# Patient Record
Sex: FEMALE | Race: WHITE | NOT HISPANIC OR LATINO | URBAN - METROPOLITAN AREA
[De-identification: names, ages, dates, MRNs, and addresses within clinical notes are randomized per-mention and may not be internally consistent; named-entity substitution may affect disease eponyms.]

---

## 2025-03-01 ENCOUNTER — APPOINTMENT (OUTPATIENT)
Dept: RADIOLOGY | Facility: HOSPITAL | Age: 80
DRG: 189 | End: 2025-03-01
Payer: MEDICARE

## 2025-03-01 ENCOUNTER — HOSPITAL ENCOUNTER (INPATIENT)
Facility: HOSPITAL | Age: 80
DRG: 189 | End: 2025-03-01
Attending: EMERGENCY MEDICINE | Admitting: INTERNAL MEDICINE
Payer: MEDICARE

## 2025-03-01 ENCOUNTER — APPOINTMENT (OUTPATIENT)
Dept: CARDIOLOGY | Facility: HOSPITAL | Age: 80
DRG: 189 | End: 2025-03-01
Payer: MEDICARE

## 2025-03-01 DIAGNOSIS — R91.1 LUNG NODULE: ICD-10-CM

## 2025-03-01 DIAGNOSIS — J44.1 COPD EXACERBATION (MULTI): ICD-10-CM

## 2025-03-01 DIAGNOSIS — R91.8 LUNG INFILTRATE ON CT: ICD-10-CM

## 2025-03-01 DIAGNOSIS — R06.02 SHORTNESS OF BREATH: Primary | ICD-10-CM

## 2025-03-01 DIAGNOSIS — Z72.0 TOBACCO ABUSE: ICD-10-CM

## 2025-03-01 LAB
ALBUMIN SERPL BCP-MCNC: 4.3 G/DL (ref 3.4–5)
ALP SERPL-CCNC: 89 U/L (ref 33–136)
ALT SERPL W P-5'-P-CCNC: 13 U/L (ref 7–45)
ANION GAP BLDV CALCULATED.4IONS-SCNC: 10 MMOL/L (ref 10–25)
ANION GAP SERPL CALC-SCNC: 14 MMOL/L (ref 10–20)
AST SERPL W P-5'-P-CCNC: 17 U/L (ref 9–39)
BASE EXCESS BLDV CALC-SCNC: 1.9 MMOL/L (ref -2–3)
BASOPHILS # BLD AUTO: 0.13 X10*3/UL (ref 0–0.1)
BASOPHILS NFR BLD AUTO: 1.4 %
BILIRUB SERPL-MCNC: 0.7 MG/DL (ref 0–1.2)
BNP SERPL-MCNC: 154 PG/ML (ref 0–99)
BODY TEMPERATURE: 37 DEGREES CELSIUS
BUN SERPL-MCNC: 13 MG/DL (ref 6–23)
CA-I BLDV-SCNC: 1.22 MMOL/L (ref 1.1–1.33)
CALCIUM SERPL-MCNC: 9.8 MG/DL (ref 8.6–10.3)
CARDIAC TROPONIN I PNL SERPL HS: 49 NG/L (ref 0–13)
CARDIAC TROPONIN I PNL SERPL HS: 58 NG/L (ref 0–13)
CHLORIDE BLDV-SCNC: 99 MMOL/L (ref 98–107)
CHLORIDE SERPL-SCNC: 100 MMOL/L (ref 98–107)
CO2 SERPL-SCNC: 26 MMOL/L (ref 21–32)
CREAT SERPL-MCNC: 0.75 MG/DL (ref 0.5–1.05)
EGFRCR SERPLBLD CKD-EPI 2021: 82 ML/MIN/1.73M*2
EOSINOPHIL # BLD AUTO: 0.99 X10*3/UL (ref 0–0.4)
EOSINOPHIL NFR BLD AUTO: 10.9 %
ERYTHROCYTE [DISTWIDTH] IN BLOOD BY AUTOMATED COUNT: 14.6 % (ref 11.5–14.5)
FLUAV RNA RESP QL NAA+PROBE: NOT DETECTED
FLUBV RNA RESP QL NAA+PROBE: NOT DETECTED
GLUCOSE BLDV-MCNC: 153 MG/DL (ref 74–99)
GLUCOSE SERPL-MCNC: 151 MG/DL (ref 74–99)
HCO3 BLDV-SCNC: 28.3 MMOL/L (ref 22–26)
HCT VFR BLD AUTO: 48.9 % (ref 36–46)
HCT VFR BLD EST: 51 % (ref 36–46)
HGB BLD-MCNC: 16.6 G/DL (ref 12–16)
HGB BLDV-MCNC: 17 G/DL (ref 12–16)
HOLD SPECIMEN: NORMAL
IMM GRANULOCYTES # BLD AUTO: 0.03 X10*3/UL (ref 0–0.5)
IMM GRANULOCYTES NFR BLD AUTO: 0.3 % (ref 0–0.9)
INHALED O2 CONCENTRATION: 36 %
LACTATE BLDV-SCNC: 1.3 MMOL/L (ref 0.4–2)
LYMPHOCYTES # BLD AUTO: 1.45 X10*3/UL (ref 0.8–3)
LYMPHOCYTES NFR BLD AUTO: 15.9 %
MAGNESIUM SERPL-MCNC: 1.68 MG/DL (ref 1.6–2.4)
MCH RBC QN AUTO: 30.5 PG (ref 26–34)
MCHC RBC AUTO-ENTMCNC: 33.9 G/DL (ref 32–36)
MCV RBC AUTO: 90 FL (ref 80–100)
MONOCYTES # BLD AUTO: 0.69 X10*3/UL (ref 0.05–0.8)
MONOCYTES NFR BLD AUTO: 7.6 %
NEUTROPHILS # BLD AUTO: 5.83 X10*3/UL (ref 1.6–5.5)
NEUTROPHILS NFR BLD AUTO: 63.9 %
NRBC BLD-RTO: 0 /100 WBCS (ref 0–0)
OXYHGB MFR BLDV: 72.8 % (ref 45–75)
PCO2 BLDV: 49 MM HG (ref 41–51)
PH BLDV: 7.37 PH (ref 7.33–7.43)
PLATELET # BLD AUTO: 301 X10*3/UL (ref 150–450)
PO2 BLDV: 50 MM HG (ref 35–45)
POTASSIUM BLDV-SCNC: 3.5 MMOL/L (ref 3.5–5.3)
POTASSIUM SERPL-SCNC: 3.5 MMOL/L (ref 3.5–5.3)
PROT SERPL-MCNC: 7.3 G/DL (ref 6.4–8.2)
RBC # BLD AUTO: 5.45 X10*6/UL (ref 4–5.2)
RSV RNA RESP QL NAA+PROBE: NOT DETECTED
SAO2 % BLDV: 76 % (ref 45–75)
SARS-COV-2 RNA RESP QL NAA+PROBE: NOT DETECTED
SODIUM BLDV-SCNC: 134 MMOL/L (ref 136–145)
SODIUM SERPL-SCNC: 136 MMOL/L (ref 136–145)
WBC # BLD AUTO: 9.1 X10*3/UL (ref 4.4–11.3)

## 2025-03-01 PROCEDURE — 84075 ASSAY ALKALINE PHOSPHATASE: CPT

## 2025-03-01 PROCEDURE — 94640 AIRWAY INHALATION TREATMENT: CPT

## 2025-03-01 PROCEDURE — 93005 ELECTROCARDIOGRAM TRACING: CPT

## 2025-03-01 PROCEDURE — 74174 CTA ABD&PLVS W/CONTRAST: CPT | Performed by: RADIOLOGY

## 2025-03-01 PROCEDURE — 84484 ASSAY OF TROPONIN QUANT: CPT

## 2025-03-01 PROCEDURE — 2550000001 HC RX 255 CONTRASTS: Performed by: EMERGENCY MEDICINE

## 2025-03-01 PROCEDURE — 36415 COLL VENOUS BLD VENIPUNCTURE: CPT

## 2025-03-01 PROCEDURE — 71046 X-RAY EXAM CHEST 2 VIEWS: CPT

## 2025-03-01 PROCEDURE — 83735 ASSAY OF MAGNESIUM: CPT

## 2025-03-01 PROCEDURE — 2500000002 HC RX 250 W HCPCS SELF ADMINISTERED DRUGS (ALT 637 FOR MEDICARE OP, ALT 636 FOR OP/ED)

## 2025-03-01 PROCEDURE — 2500000004 HC RX 250 GENERAL PHARMACY W/ HCPCS (ALT 636 FOR OP/ED): Performed by: INTERNAL MEDICINE

## 2025-03-01 PROCEDURE — 84132 ASSAY OF SERUM POTASSIUM: CPT

## 2025-03-01 PROCEDURE — 2500000004 HC RX 250 GENERAL PHARMACY W/ HCPCS (ALT 636 FOR OP/ED)

## 2025-03-01 PROCEDURE — 99223 1ST HOSP IP/OBS HIGH 75: CPT | Performed by: INTERNAL MEDICINE

## 2025-03-01 PROCEDURE — 83880 ASSAY OF NATRIURETIC PEPTIDE: CPT

## 2025-03-01 PROCEDURE — 85025 COMPLETE CBC W/AUTO DIFF WBC: CPT

## 2025-03-01 PROCEDURE — 1200000002 HC GENERAL ROOM WITH TELEMETRY DAILY

## 2025-03-01 PROCEDURE — 71275 CT ANGIOGRAPHY CHEST: CPT | Performed by: RADIOLOGY

## 2025-03-01 PROCEDURE — 99285 EMERGENCY DEPT VISIT HI MDM: CPT | Mod: 25 | Performed by: EMERGENCY MEDICINE

## 2025-03-01 PROCEDURE — 87637 SARSCOV2&INF A&B&RSV AMP PRB: CPT

## 2025-03-01 PROCEDURE — 2500000001 HC RX 250 WO HCPCS SELF ADMINISTERED DRUGS (ALT 637 FOR MEDICARE OP): Performed by: INTERNAL MEDICINE

## 2025-03-01 PROCEDURE — 74174 CTA ABD&PLVS W/CONTRAST: CPT

## 2025-03-01 PROCEDURE — 36591 DRAW BLOOD OFF VENOUS DEVICE: CPT | Performed by: EMERGENCY MEDICINE

## 2025-03-01 PROCEDURE — 71046 X-RAY EXAM CHEST 2 VIEWS: CPT | Performed by: RADIOLOGY

## 2025-03-01 PROCEDURE — 96375 TX/PRO/DX INJ NEW DRUG ADDON: CPT

## 2025-03-01 PROCEDURE — 71275 CT ANGIOGRAPHY CHEST: CPT

## 2025-03-01 PROCEDURE — 96365 THER/PROPH/DIAG IV INF INIT: CPT

## 2025-03-01 RX ORDER — GUAIFENESIN 600 MG/1
1200 TABLET, EXTENDED RELEASE ORAL 2 TIMES DAILY
Status: DISCONTINUED | OUTPATIENT
Start: 2025-03-01 | End: 2025-03-03 | Stop reason: HOSPADM

## 2025-03-01 RX ORDER — PANTOPRAZOLE SODIUM 40 MG/1
40 TABLET, DELAYED RELEASE ORAL
COMMUNITY
Start: 2024-12-02 | End: 2025-03-13 | Stop reason: WASHOUT

## 2025-03-01 RX ORDER — IBUPROFEN 200 MG
1 TABLET ORAL DAILY
Status: DISCONTINUED | OUTPATIENT
Start: 2025-03-01 | End: 2025-03-03 | Stop reason: HOSPADM

## 2025-03-01 RX ORDER — ASPIRIN 81 MG/1
81 TABLET ORAL DAILY
COMMUNITY
Start: 2022-09-28

## 2025-03-01 RX ORDER — CEFTRIAXONE 1 G/50ML
1 INJECTION, SOLUTION INTRAVENOUS DAILY
Status: DISCONTINUED | OUTPATIENT
Start: 2025-03-01 | End: 2025-03-03 | Stop reason: HOSPADM

## 2025-03-01 RX ORDER — METOPROLOL SUCCINATE 50 MG/1
50 TABLET, EXTENDED RELEASE ORAL DAILY
COMMUNITY
Start: 2025-01-31

## 2025-03-01 RX ORDER — BENZONATATE 100 MG/1
200 CAPSULE ORAL 3 TIMES DAILY PRN
Status: DISCONTINUED | OUTPATIENT
Start: 2025-03-01 | End: 2025-03-03 | Stop reason: HOSPADM

## 2025-03-01 RX ORDER — ASPIRIN 81 MG/1
81 TABLET ORAL DAILY
Status: DISCONTINUED | OUTPATIENT
Start: 2025-03-01 | End: 2025-03-03 | Stop reason: HOSPADM

## 2025-03-01 RX ORDER — METOPROLOL SUCCINATE 50 MG/1
50 TABLET, EXTENDED RELEASE ORAL DAILY
Status: DISCONTINUED | OUTPATIENT
Start: 2025-03-01 | End: 2025-03-03 | Stop reason: HOSPADM

## 2025-03-01 RX ORDER — ATORVASTATIN CALCIUM 80 MG/1
1 TABLET, FILM COATED ORAL DAILY
COMMUNITY
Start: 2022-09-28

## 2025-03-01 RX ORDER — ATORVASTATIN CALCIUM 80 MG/1
80 TABLET, FILM COATED ORAL DAILY
Status: DISCONTINUED | OUTPATIENT
Start: 2025-03-01 | End: 2025-03-03 | Stop reason: HOSPADM

## 2025-03-01 RX ORDER — LORAZEPAM 0.5 MG/1
0.5 TABLET ORAL EVERY 6 HOURS PRN
Status: DISCONTINUED | OUTPATIENT
Start: 2025-03-01 | End: 2025-03-03 | Stop reason: HOSPADM

## 2025-03-01 RX ORDER — BENZONATATE 200 MG/1
200 CAPSULE ORAL 3 TIMES DAILY PRN
COMMUNITY
Start: 2025-01-31

## 2025-03-01 RX ORDER — IPRATROPIUM BROMIDE AND ALBUTEROL SULFATE 2.5; .5 MG/3ML; MG/3ML
3 SOLUTION RESPIRATORY (INHALATION)
Status: DISCONTINUED | OUTPATIENT
Start: 2025-03-01 | End: 2025-03-01

## 2025-03-01 RX ORDER — IPRATROPIUM BROMIDE AND ALBUTEROL SULFATE 2.5; .5 MG/3ML; MG/3ML
3 SOLUTION RESPIRATORY (INHALATION) EVERY 2 HOUR PRN
Status: DISCONTINUED | OUTPATIENT
Start: 2025-03-01 | End: 2025-03-03 | Stop reason: HOSPADM

## 2025-03-01 RX ORDER — ALBUTEROL SULFATE 90 UG/1
2 INHALANT RESPIRATORY (INHALATION) EVERY 4 HOURS PRN
COMMUNITY
Start: 2025-02-20

## 2025-03-01 RX ORDER — FORMOTEROL FUMARATE 20 UG/2ML
20 SOLUTION RESPIRATORY (INHALATION)
Status: DISCONTINUED | OUTPATIENT
Start: 2025-03-01 | End: 2025-03-03 | Stop reason: HOSPADM

## 2025-03-01 RX ORDER — AZITHROMYCIN 250 MG/1
250 TABLET, FILM COATED ORAL
Status: DISCONTINUED | OUTPATIENT
Start: 2025-03-02 | End: 2025-03-02

## 2025-03-01 RX ORDER — TRAMADOL HYDROCHLORIDE 50 MG/1
50 TABLET ORAL EVERY 6 HOURS PRN
Status: DISCONTINUED | OUTPATIENT
Start: 2025-03-01 | End: 2025-03-03 | Stop reason: HOSPADM

## 2025-03-01 RX ORDER — AMLODIPINE BESYLATE 5 MG/1
5 TABLET ORAL
Status: DISCONTINUED | OUTPATIENT
Start: 2025-03-01 | End: 2025-03-03 | Stop reason: HOSPADM

## 2025-03-01 RX ORDER — TRAMADOL HYDROCHLORIDE 50 MG/1
50 TABLET ORAL EVERY 6 HOURS PRN
COMMUNITY
Start: 2025-02-26

## 2025-03-01 RX ORDER — GABAPENTIN 300 MG/1
300 CAPSULE ORAL 2 TIMES DAILY
Status: DISCONTINUED | OUTPATIENT
Start: 2025-03-01 | End: 2025-03-03 | Stop reason: HOSPADM

## 2025-03-01 RX ORDER — ESCITALOPRAM OXALATE 10 MG/1
10 TABLET ORAL DAILY
Status: DISCONTINUED | OUTPATIENT
Start: 2025-03-01 | End: 2025-03-01

## 2025-03-01 RX ORDER — AMLODIPINE BESYLATE 5 MG/1
5 TABLET ORAL
COMMUNITY
Start: 2024-12-29 | End: 2025-12-29

## 2025-03-01 RX ORDER — LISINOPRIL 40 MG/1
40 TABLET ORAL
Status: DISCONTINUED | OUTPATIENT
Start: 2025-03-01 | End: 2025-03-03 | Stop reason: HOSPADM

## 2025-03-01 RX ORDER — IPRATROPIUM BROMIDE AND ALBUTEROL SULFATE 2.5; .5 MG/3ML; MG/3ML
3 SOLUTION RESPIRATORY (INHALATION)
Status: DISCONTINUED | OUTPATIENT
Start: 2025-03-01 | End: 2025-03-03 | Stop reason: HOSPADM

## 2025-03-01 RX ORDER — IPRATROPIUM BROMIDE AND ALBUTEROL SULFATE 2.5; .5 MG/3ML; MG/3ML
SOLUTION RESPIRATORY (INHALATION)
Status: COMPLETED
Start: 2025-03-01 | End: 2025-03-01

## 2025-03-01 RX ORDER — IPRATROPIUM BROMIDE AND ALBUTEROL SULFATE 2.5; .5 MG/3ML; MG/3ML
3 SOLUTION RESPIRATORY (INHALATION) EVERY 20 MIN
Status: COMPLETED | OUTPATIENT
Start: 2025-03-01 | End: 2025-03-01

## 2025-03-01 RX ORDER — GABAPENTIN 300 MG/1
1 CAPSULE ORAL 2 TIMES DAILY
COMMUNITY
Start: 2022-09-28

## 2025-03-01 RX ORDER — ESCITALOPRAM OXALATE 10 MG/1
10 TABLET ORAL
COMMUNITY
Start: 2025-02-26 | End: 2026-02-26

## 2025-03-01 RX ORDER — TRAZODONE HYDROCHLORIDE 50 MG/1
100 TABLET ORAL NIGHTLY
Status: DISCONTINUED | OUTPATIENT
Start: 2025-03-01 | End: 2025-03-03 | Stop reason: HOSPADM

## 2025-03-01 RX ORDER — LISINOPRIL 40 MG/1
40 TABLET ORAL
COMMUNITY
Start: 2025-01-31 | End: 2026-01-31

## 2025-03-01 RX ORDER — UMECLIDINIUM BROMIDE AND VILANTEROL TRIFENATATE 62.5; 25 UG/1; UG/1
1 POWDER RESPIRATORY (INHALATION)
COMMUNITY
Start: 2025-02-14 | End: 2026-02-14

## 2025-03-01 RX ORDER — TRAZODONE HYDROCHLORIDE 50 MG/1
100 TABLET ORAL NIGHTLY
COMMUNITY
Start: 2025-01-14

## 2025-03-01 RX ORDER — MAGNESIUM SULFATE HEPTAHYDRATE 40 MG/ML
2 INJECTION, SOLUTION INTRAVENOUS ONCE
Status: COMPLETED | OUTPATIENT
Start: 2025-03-01 | End: 2025-03-01

## 2025-03-01 RX ADMIN — IPRATROPIUM BROMIDE AND ALBUTEROL SULFATE 3 ML: .5; 3 SOLUTION RESPIRATORY (INHALATION) at 10:24

## 2025-03-01 RX ADMIN — IPRATROPIUM BROMIDE AND ALBUTEROL SULFATE 3 ML: .5; 3 SOLUTION RESPIRATORY (INHALATION) at 10:22

## 2025-03-01 RX ADMIN — IPRATROPIUM BROMIDE AND ALBUTEROL SULFATE 3 ML: .5; 3 SOLUTION RESPIRATORY (INHALATION) at 10:33

## 2025-03-01 RX ADMIN — METHYLPREDNISOLONE SODIUM SUCCINATE 125 MG: 125 INJECTION, POWDER, FOR SOLUTION INTRAMUSCULAR; INTRAVENOUS at 10:53

## 2025-03-01 RX ADMIN — MAGNESIUM SULFATE HEPTAHYDRATE 2 G: 40 INJECTION, SOLUTION INTRAVENOUS at 10:53

## 2025-03-01 RX ADMIN — IOHEXOL 75 ML: 350 INJECTION, SOLUTION INTRAVENOUS at 12:16

## 2025-03-01 RX ADMIN — GUAIFENESIN 1200 MG: 600 TABLET ORAL at 21:18

## 2025-03-01 RX ADMIN — CEFTRIAXONE SODIUM 1 G: 1 INJECTION, SOLUTION INTRAVENOUS at 21:20

## 2025-03-01 RX ADMIN — LORAZEPAM 0.5 MG: 0.5 TABLET ORAL at 21:18

## 2025-03-01 RX ADMIN — TRAZODONE HYDROCHLORIDE 100 MG: 50 TABLET ORAL at 21:19

## 2025-03-01 RX ADMIN — IOHEXOL 60 ML: 350 INJECTION, SOLUTION INTRAVENOUS at 12:17

## 2025-03-01 RX ADMIN — GABAPENTIN 300 MG: 300 CAPSULE ORAL at 21:18

## 2025-03-01 RX ADMIN — METHYLPREDNISOLONE SODIUM SUCCINATE 40 MG: 40 INJECTION, POWDER, FOR SOLUTION INTRAMUSCULAR; INTRAVENOUS at 18:14

## 2025-03-01 SDOH — ECONOMIC STABILITY: TRANSPORTATION INSECURITY: IN THE PAST 12 MONTHS, HAS LACK OF TRANSPORTATION KEPT YOU FROM MEDICAL APPOINTMENTS OR FROM GETTING MEDICATIONS?: NO

## 2025-03-01 SDOH — SOCIAL STABILITY: SOCIAL INSECURITY: ABUSE: ADULT

## 2025-03-01 SDOH — SOCIAL STABILITY: SOCIAL INSECURITY: WITHIN THE LAST YEAR, HAVE YOU BEEN AFRAID OF YOUR PARTNER OR EX-PARTNER?: NO

## 2025-03-01 SDOH — ECONOMIC STABILITY: INCOME INSECURITY: IN THE PAST 12 MONTHS HAS THE ELECTRIC, GAS, OIL, OR WATER COMPANY THREATENED TO SHUT OFF SERVICES IN YOUR HOME?: NO

## 2025-03-01 SDOH — SOCIAL STABILITY: SOCIAL INSECURITY: WERE YOU ABLE TO COMPLETE ALL THE BEHAVIORAL HEALTH SCREENINGS?: YES

## 2025-03-01 SDOH — HEALTH STABILITY: MENTAL HEALTH: HOW OFTEN DO YOU HAVE A DRINK CONTAINING ALCOHOL?: NEVER

## 2025-03-01 SDOH — ECONOMIC STABILITY: HOUSING INSECURITY: IN THE LAST 12 MONTHS, WAS THERE A TIME WHEN YOU WERE NOT ABLE TO PAY THE MORTGAGE OR RENT ON TIME?: NO

## 2025-03-01 SDOH — ECONOMIC STABILITY: FOOD INSECURITY: HOW HARD IS IT FOR YOU TO PAY FOR THE VERY BASICS LIKE FOOD, HOUSING, MEDICAL CARE, AND HEATING?: NOT VERY HARD

## 2025-03-01 SDOH — SOCIAL STABILITY: SOCIAL INSECURITY
WITHIN THE LAST YEAR, HAVE YOU BEEN KICKED, HIT, SLAPPED, OR OTHERWISE PHYSICALLY HURT BY YOUR PARTNER OR EX-PARTNER?: NO

## 2025-03-01 SDOH — SOCIAL STABILITY: SOCIAL INSECURITY: WITHIN THE LAST YEAR, HAVE YOU BEEN HUMILIATED OR EMOTIONALLY ABUSED IN OTHER WAYS BY YOUR PARTNER OR EX-PARTNER?: NO

## 2025-03-01 SDOH — ECONOMIC STABILITY: HOUSING INSECURITY: IN THE PAST 12 MONTHS, HOW MANY TIMES HAVE YOU MOVED WHERE YOU WERE LIVING?: 0

## 2025-03-01 SDOH — SOCIAL STABILITY: SOCIAL INSECURITY: ARE THERE ANY APPARENT SIGNS OF INJURIES/BEHAVIORS THAT COULD BE RELATED TO ABUSE/NEGLECT?: NO

## 2025-03-01 SDOH — HEALTH STABILITY: MENTAL HEALTH: HOW OFTEN DO YOU HAVE SIX OR MORE DRINKS ON ONE OCCASION?: NEVER

## 2025-03-01 SDOH — ECONOMIC STABILITY: FOOD INSECURITY: WITHIN THE PAST 12 MONTHS, YOU WORRIED THAT YOUR FOOD WOULD RUN OUT BEFORE YOU GOT THE MONEY TO BUY MORE.: NEVER TRUE

## 2025-03-01 SDOH — SOCIAL STABILITY: SOCIAL INSECURITY
WITHIN THE LAST YEAR, HAVE YOU BEEN RAPED OR FORCED TO HAVE ANY KIND OF SEXUAL ACTIVITY BY YOUR PARTNER OR EX-PARTNER?: NO

## 2025-03-01 SDOH — SOCIAL STABILITY: SOCIAL INSECURITY: HAVE YOU HAD THOUGHTS OF HARMING ANYONE ELSE?: NO

## 2025-03-01 SDOH — SOCIAL STABILITY: SOCIAL INSECURITY: HAVE YOU HAD ANY THOUGHTS OF HARMING ANYONE ELSE?: NO

## 2025-03-01 SDOH — SOCIAL STABILITY: SOCIAL INSECURITY: HAS ANYONE EVER THREATENED TO HURT YOUR FAMILY OR YOUR PETS?: NO

## 2025-03-01 SDOH — SOCIAL STABILITY: SOCIAL INSECURITY: DO YOU FEEL ANYONE HAS EXPLOITED OR TAKEN ADVANTAGE OF YOU FINANCIALLY OR OF YOUR PERSONAL PROPERTY?: NO

## 2025-03-01 SDOH — ECONOMIC STABILITY: FOOD INSECURITY: WITHIN THE PAST 12 MONTHS, THE FOOD YOU BOUGHT JUST DIDN'T LAST AND YOU DIDN'T HAVE MONEY TO GET MORE.: NEVER TRUE

## 2025-03-01 SDOH — SOCIAL STABILITY: SOCIAL INSECURITY: DOES ANYONE TRY TO KEEP YOU FROM HAVING/CONTACTING OTHER FRIENDS OR DOING THINGS OUTSIDE YOUR HOME?: NO

## 2025-03-01 SDOH — HEALTH STABILITY: MENTAL HEALTH: HOW MANY DRINKS CONTAINING ALCOHOL DO YOU HAVE ON A TYPICAL DAY WHEN YOU ARE DRINKING?: PATIENT DOES NOT DRINK

## 2025-03-01 SDOH — ECONOMIC STABILITY: HOUSING INSECURITY: AT ANY TIME IN THE PAST 12 MONTHS, WERE YOU HOMELESS OR LIVING IN A SHELTER (INCLUDING NOW)?: NO

## 2025-03-01 SDOH — SOCIAL STABILITY: SOCIAL INSECURITY: DO YOU FEEL UNSAFE GOING BACK TO THE PLACE WHERE YOU ARE LIVING?: NO

## 2025-03-01 SDOH — SOCIAL STABILITY: SOCIAL INSECURITY: ARE YOU OR HAVE YOU BEEN THREATENED OR ABUSED PHYSICALLY, EMOTIONALLY, OR SEXUALLY BY ANYONE?: NO

## 2025-03-01 ASSESSMENT — LIFESTYLE VARIABLES
EVER HAD A DRINK FIRST THING IN THE MORNING TO STEADY YOUR NERVES TO GET RID OF A HANGOVER: NO
HOW OFTEN DO YOU HAVE 6 OR MORE DRINKS ON ONE OCCASION: NEVER
TOTAL SCORE: 0
SKIP TO QUESTIONS 9-10: 1
AUDIT-C TOTAL SCORE: 0
HOW OFTEN DO YOU HAVE A DRINK CONTAINING ALCOHOL: NEVER
AUDIT-C TOTAL SCORE: 0
HOW MANY STANDARD DRINKS CONTAINING ALCOHOL DO YOU HAVE ON A TYPICAL DAY: PATIENT DOES NOT DRINK
SKIP TO QUESTIONS 9-10: 1
AUDIT-C TOTAL SCORE: 0
EVER FELT BAD OR GUILTY ABOUT YOUR DRINKING: NO
HAVE YOU EVER FELT YOU SHOULD CUT DOWN ON YOUR DRINKING: NO
HAVE PEOPLE ANNOYED YOU BY CRITICIZING YOUR DRINKING: NO

## 2025-03-01 ASSESSMENT — ACTIVITIES OF DAILY LIVING (ADL)
HEARING - RIGHT EAR: FUNCTIONAL
ADEQUATE_TO_COMPLETE_ADL: YES
HEARING - LEFT EAR: FUNCTIONAL
FEEDING YOURSELF: INDEPENDENT
GROOMING: INDEPENDENT
BATHING: INDEPENDENT
LACK_OF_TRANSPORTATION: NO
TOILETING: INDEPENDENT
WALKS IN HOME: INDEPENDENT
DRESSING YOURSELF: INDEPENDENT
JUDGMENT_ADEQUATE_SAFELY_COMPLETE_DAILY_ACTIVITIES: YES
LACK_OF_TRANSPORTATION: NO
PATIENT'S MEMORY ADEQUATE TO SAFELY COMPLETE DAILY ACTIVITIES?: YES

## 2025-03-01 ASSESSMENT — COGNITIVE AND FUNCTIONAL STATUS - GENERAL
HELP NEEDED FOR BATHING: A LITTLE
DAILY ACTIVITIY SCORE: 23
PATIENT BASELINE BEDBOUND: NO
CLIMB 3 TO 5 STEPS WITH RAILING: A LITTLE
MOBILITY SCORE: 23

## 2025-03-01 ASSESSMENT — COLUMBIA-SUICIDE SEVERITY RATING SCALE - C-SSRS
1. IN THE PAST MONTH, HAVE YOU WISHED YOU WERE DEAD OR WISHED YOU COULD GO TO SLEEP AND NOT WAKE UP?: NO
2. HAVE YOU ACTUALLY HAD ANY THOUGHTS OF KILLING YOURSELF?: NO
6. HAVE YOU EVER DONE ANYTHING, STARTED TO DO ANYTHING, OR PREPARED TO DO ANYTHING TO END YOUR LIFE?: NO

## 2025-03-01 ASSESSMENT — ENCOUNTER SYMPTOMS
BACK PAIN: 1
FATIGUE: 1
COUGH: 1
SHORTNESS OF BREATH: 1
UNEXPECTED WEIGHT CHANGE: 1
NERVOUS/ANXIOUS: 1

## 2025-03-01 ASSESSMENT — PATIENT HEALTH QUESTIONNAIRE - PHQ9
SUM OF ALL RESPONSES TO PHQ9 QUESTIONS 1 & 2: 0
1. LITTLE INTEREST OR PLEASURE IN DOING THINGS: NOT AT ALL
2. FEELING DOWN, DEPRESSED OR HOPELESS: NOT AT ALL

## 2025-03-01 ASSESSMENT — PAIN SCALES - GENERAL
PAINLEVEL_OUTOF10: 0 - NO PAIN
PAINLEVEL_OUTOF10: 0 - NO PAIN

## 2025-03-01 ASSESSMENT — PAIN - FUNCTIONAL ASSESSMENT: PAIN_FUNCTIONAL_ASSESSMENT: 0-10

## 2025-03-01 NOTE — PROGRESS NOTES
Pharmacy Medication History Review    Radha Logan is a 79 y.o. female admitted for Shortness of breath. Pharmacy reviewed the patient's [id number] medications and allergies for accuracy.    The list below reflectives the updated PTA list. Please review each medication in order reconciliation for additional clarification and justification.  Prior to Admission medications    Medication Sig Start Date End Date Taking? Authorizing Provider   albuterol 90 mcg/actuation inhaler Inhale 2 puffs every 4 hours if needed for wheezing or shortness of breath. 2/20/25  Yes Historical Provider, MD   amLODIPine (Norvasc) 5 mg tablet Take 1 tablet (5 mg) by mouth once daily. 12/29/24 12/29/25 Yes Historical Provider, MD   Anoro Ellipta 62.5-25 mcg/actuation blister with device Inhale 1 puff once daily. 2/14/25 2/14/26 Yes Historical Provider, MD   atorvastatin (Lipitor) 80 mg tablet Take 1 tablet (80 mg) by mouth once daily. 9/28/22  Yes Historical Provider, MD   benzonatate (Tessalon) 200 mg capsule Take 1 capsule (200 mg) by mouth 3 times a day as needed for cough. 1/31/25  Yes Historical Provider, MD   escitalopram (Lexapro) 10 mg tablet Take 1 tablet (10 mg) by mouth once daily. 2/26/25 2/26/26 Yes Historical Provider, MD   gabapentin (Neurontin) 300 mg capsule Take 1 capsule (300 mg) by mouth 2 times a day. 9/28/22  Yes Historical Provider, MD   lisinopril 40 mg tablet Take 1 tablet (40 mg) by mouth once daily. 1/31/25 1/31/26 Yes Historical Provider, MD   metoprolol succinate XL (Toprol-XL) 50 mg 24 hr tablet Take 1 tablet (50 mg) by mouth once daily. 1/31/25  Yes Historical Provider, MD   pantoprazole (ProtoNix) 40 mg EC tablet Take 1 tablet (40 mg) by mouth once daily.  Patient not taking: Reported on 3/1/2025 12/2/24 5/31/25  Historical Provider, MD   traMADol (Ultram) 50 mg tablet Take 1 tablet (50 mg) by mouth every 6 hours if needed. 2/26/25  Yes Historical Provider, MD   traZODone (Desyrel) 50 mg tablet Take  2 tablets (100 mg) by mouth once daily at bedtime. 1/14/25  Yes Historical Provider, MD   aspirin 81 mg EC tablet Take 1 tablet (81 mg) by mouth once daily. 9/28/22  Yes Historical Provider, MD        The list below reflectives the updated allergy list. Please review each documented allergy for additional clarification and justification.  Allergies  Indicated as Unable to Assess by Delia Rankin RN on 3/1/2025 (Patient unable to communicate)        Severity Reactions Comments    Azithromycin Low Nausea Only     Codeine Low Itching     Doxycycline Low Nausea Only     Penicillins Low Nausea/vomiting vomiting            Below are additional concerns with the patient's PTA list.    Patient medical and pharmacy records reviewed for supplemental history.    Adelaide Suarez

## 2025-03-01 NOTE — ED TRIAGE NOTES
Arrives from home with c/o shortness of breath getting worse for a few weeks, states she was dx with pneumonia given abx but did not finish them d/t side effects. Has been using husbands o2

## 2025-03-01 NOTE — H&P
History Of Present Illness  Radha Logan is a 79 y.o. female with a past medical history of COPD, HTN, HLD, RBBB, and MALS. presenting with shortness of breath for one month. Patient left hospital AMA 02/11/2025 with pneumonia. She was given Levaquin on release and states she only took it for 1 day due to N/V. She has not seen any improvement in symptoms stating she has been using her husbands oxygen 2L at home, without the oxygen she can not make it to the kitchen. She has a productive cough clear in color without hemoptysis. Denies N/V/D, congestion, sore throat, and CP.     Past Medical History  HTN  HLD  COPD  RBBB  Lupus anticoagulant  MALS  Chronic back pain  H/o MI (5-7 years ago)      Surgical History  2 C-sections     Social History  Tobacco: cigarettes- quit 2 days ago    Family History  Sister- brain cancer     Allergies  Codeine- Itching  Penicillins- N/V  Fluoroquinolones  Azithromycin- N  Doxycycline- N      Review of Systems   Constitutional:  Positive for fatigue and unexpected weight change.   Respiratory:  Positive for cough and shortness of breath.    Musculoskeletal:  Positive for back pain.   Psychiatric/Behavioral:  The patient is nervous/anxious.    12 systems reviewed- negative for any other symptoms     Physical Exam  Constitutional:       General: She is not in acute distress.  HENT:      Head: Normocephalic and atraumatic.      Mouth/Throat:      Mouth: Mucous membranes are moist.   Eyes:      Pupils: Pupils are equal, round, and reactive to light.   Cardiovascular:      Rate and Rhythm: Normal rate and regular rhythm.   Pulmonary:      Breath sounds: Examination of the right-upper field reveals wheezing. Examination of the left-upper field reveals wheezing. Examination of the right-lower field reveals decreased breath sounds. Decreased breath sounds and wheezing present.   Abdominal:      General: Bowel sounds are normal.      Palpations: Abdomen is soft.      Tenderness: There is no  abdominal tenderness.   Musculoskeletal:      Right lower leg: No edema.      Left lower leg: No edema.   Skin:     Findings: No bruising or rash.   Neurological:      Mental Status: She is alert.          Last Recorded Vitals  BP (!) 156/113   Pulse 64   Temp 36.5 °C (97.7 °F) (Temporal)   Resp (!) 25   Wt 48.1 kg (106 lb)   SpO2 (!) 92%     Relevant Results  No current facility-administered medications for this encounter.    Current Outpatient Medications:     albuterol 90 mcg/actuation inhaler, Inhale 2 puffs every 4 hours if needed for wheezing or shortness of breath., Disp: , Rfl:     amLODIPine (Norvasc) 5 mg tablet, Take 1 tablet (5 mg) by mouth once daily., Disp: , Rfl:     Anoro Ellipta 62.5-25 mcg/actuation blister with device, Inhale 1 puff once daily., Disp: , Rfl:     atorvastatin (Lipitor) 80 mg tablet, Take 1 tablet (80 mg) by mouth once daily., Disp: , Rfl:     benzonatate (Tessalon) 200 mg capsule, Take 1 capsule (200 mg) by mouth 3 times a day as needed for cough., Disp: , Rfl:     escitalopram (Lexapro) 10 mg tablet, Take 1 tablet (10 mg) by mouth once daily., Disp: , Rfl:     gabapentin (Neurontin) 300 mg capsule, Take 1 capsule (300 mg) by mouth 2 times a day., Disp: , Rfl:     lisinopril 40 mg tablet, Take 1 tablet (40 mg) by mouth once daily., Disp: , Rfl:     metoprolol succinate XL (Toprol-XL) 50 mg 24 hr tablet, Take 1 tablet (50 mg) by mouth once daily., Disp: , Rfl:     pantoprazole (ProtoNix) 40 mg EC tablet, Take 1 tablet (40 mg) by mouth once daily. (Patient not taking: Reported on 3/1/2025), Disp: , Rfl:     traMADol (Ultram) 50 mg tablet, Take 1 tablet (50 mg) by mouth every 6 hours if needed., Disp: , Rfl:     traZODone (Desyrel) 50 mg tablet, Take 2 tablets (100 mg) by mouth once daily at bedtime., Disp: , Rfl:     aspirin 81 mg EC tablet, Take 1 tablet (81 mg) by mouth once daily., Disp: , Rfl:      LABS  Results for orders placed or performed during the hospital encounter of  03/01/25 (from the past 24 hours)   Light Blue Top   Result Value Ref Range    Extra Tube Hold for add-ons.    CBC and Auto Differential   Result Value Ref Range    WBC 9.1 4.4 - 11.3 x10*3/uL    nRBC 0.0 0.0 - 0.0 /100 WBCs    RBC 5.45 (H) 4.00 - 5.20 x10*6/uL    Hemoglobin 16.6 (H) 12.0 - 16.0 g/dL    Hematocrit 48.9 (H) 36.0 - 46.0 %    MCV 90 80 - 100 fL    MCH 30.5 26.0 - 34.0 pg    MCHC 33.9 32.0 - 36.0 g/dL    RDW 14.6 (H) 11.5 - 14.5 %    Platelets 301 150 - 450 x10*3/uL    Neutrophils % 63.9 40.0 - 80.0 %    Immature Granulocytes %, Automated 0.3 0.0 - 0.9 %    Lymphocytes % 15.9 13.0 - 44.0 %    Monocytes % 7.6 2.0 - 10.0 %    Eosinophils % 10.9 0.0 - 6.0 %    Basophils % 1.4 0.0 - 2.0 %    Neutrophils Absolute 5.83 (H) 1.60 - 5.50 x10*3/uL    Immature Granulocytes Absolute, Automated 0.03 0.00 - 0.50 x10*3/uL    Lymphocytes Absolute 1.45 0.80 - 3.00 x10*3/uL    Monocytes Absolute 0.69 0.05 - 0.80 x10*3/uL    Eosinophils Absolute 0.99 (H) 0.00 - 0.40 x10*3/uL    Basophils Absolute 0.13 (H) 0.00 - 0.10 x10*3/uL   Comprehensive metabolic panel   Result Value Ref Range    Glucose 151 (H) 74 - 99 mg/dL    Sodium 136 136 - 145 mmol/L    Potassium 3.5 3.5 - 5.3 mmol/L    Chloride 100 98 - 107 mmol/L    Bicarbonate 26 21 - 32 mmol/L    Anion Gap 14 10 - 20 mmol/L    Urea Nitrogen 13 6 - 23 mg/dL    Creatinine 0.75 0.50 - 1.05 mg/dL    eGFR 82 >60 mL/min/1.73m*2    Calcium 9.8 8.6 - 10.3 mg/dL    Albumin 4.3 3.4 - 5.0 g/dL    Alkaline Phosphatase 89 33 - 136 U/L    Total Protein 7.3 6.4 - 8.2 g/dL    AST 17 9 - 39 U/L    Bilirubin, Total 0.7 0.0 - 1.2 mg/dL    ALT 13 7 - 45 U/L   Magnesium   Result Value Ref Range    Magnesium 1.68 1.60 - 2.40 mg/dL   B-Type Natriuretic Peptide   Result Value Ref Range     (H) 0 - 99 pg/mL   Blood Gas Venous Full Panel   Result Value Ref Range    POCT pH, Venous 7.37 7.33 - 7.43 pH    POCT pCO2, Venous 49 41 - 51 mm Hg    POCT pO2, Venous 50 (H) 35 - 45 mm Hg    POCT SO2,  Venous 76 (H) 45 - 75 %    POCT Oxy Hemoglobin, Venous 72.8 45.0 - 75.0 %    POCT Hematocrit Calculated, Venous 51.0 (H) 36.0 - 46.0 %    POCT Sodium, Venous 134 (L) 136 - 145 mmol/L    POCT Potassium, Venous 3.5 3.5 - 5.3 mmol/L    POCT Chloride, Venous 99 98 - 107 mmol/L    POCT Ionized Calicum, Venous 1.22 1.10 - 1.33 mmol/L    POCT Glucose, Venous 153 (H) 74 - 99 mg/dL    POCT Lactate, Venous 1.3 0.4 - 2.0 mmol/L    POCT Base Excess, Venous 1.9 -2.0 - 3.0 mmol/L    POCT HCO3 Calculated, Venous 28.3 (H) 22.0 - 26.0 mmol/L    POCT Hemoglobin, Venous 17.0 (H) 12.0 - 16.0 g/dL    POCT Anion Gap, Venous 10.0 10.0 - 25.0 mmol/L    Patient Temperature 37.0 degrees Celsius    FiO2 36 %   Troponin I, High Sensitivity, Initial   Result Value Ref Range    Troponin I, High Sensitivity 58 (HH) 0 - 13 ng/L   Sars-CoV-2 and Influenza A/B PCR   Result Value Ref Range    Flu A Result Not Detected Not Detected    Flu B Result Not Detected Not Detected    Coronavirus 2019, PCR Not Detected Not Detected   RSV PCR   Result Value Ref Range    RSV PCR Not Detected Not Detected   Troponin, High Sensitivity, 1 Hour   Result Value Ref Range    Troponin I, High Sensitivity 49 (H) 0 - 13 ng/L      IMAGING  CT angio chest for pulmonary embolism    Result Date: 3/1/2025  Interpreted By:  Hayden Sears, STUDY: CT ANGIO CHEST FOR PULMONARY EMBOLISM; CT ANGIO ABDOMEN PELVIS W AND/OR WO IV IV CONTRAST;  3/1/2025 12:18 pm; 3/1/2025 12:17 pm   INDICATION: Signs/Symptoms:concern for PE/pneumonia; Signs/Symptoms:cp, possibly aortic pathology see on ct angio chest.   COMPARISON: None.   ACCESSION NUMBER(S): [id number]; [id number]   ORDERING CLINICIAN: MARK DOWD   TECHNIQUE: CT of the chest, abdomen, and pelvis was performed.  Contiguous axial images were obtained at 3 mm slice thickness through the chest, abdomen and pelvis. Coronal and sagittal reconstructions at 3mm slice thickness were performed. 75 mL Omnipaque 350 administered  intravenously without immediate complication. MIP reformatted images were generated.   FINDINGS: CHEST:   LUNG/PLEURA/LARGE AIRWAYS: Severe emphysematous changes are present. There is a spiculated nodule in the left upper lobe anteriorly measuring 12 mm. A few nodules in the right lower lobe appears linear on orthogonal planes suggesting scars for example measuring 0.7 cm (Series 406, Image 190) and 0.5 cm (Series 406, Image 220). Calcified granulomas in the right lower lobe are also noted. Otherwise the lungs appear clear. No pleural effusion or pneumothorax Central airways are patent. There are areas endobronchial mucous plugging involving both lower lobes.   VESSELS: No pulmonary embolism identified. Severe coronary atherosclerosis. Severe thoracic aortic atherosclerosis. Ascending aorta is not significantly dilated measuring 34 mm. There is extensive mixing artifact throughout the descending thoracic aorta which is more dilated than the ascending aorta. The proximal descending aorta measures 36 mm and more distally there is a fusiform aneurysm measuring 41 mm. Aneurysm does not extend below the diaphragmatic hiatus.   The thoracic great vessels are tortuous but otherwise patent.   There is severe abdominal aortic atherosclerosis without aneurysm. The abdominal aorta is diffusely ectatic measuring up to 29 mm proximally. The celiac, superior mesenteric, right renal, left renal, and inferior mesenteric arteries are patent.   There is severe atherosclerosis of the bilateral common, internal, and external iliac arteries which are otherwise patent without aneurysm.   HEART: Mild cardiomegaly. No significant pericardial effusion.   MEDIASTINUM AND MONSERRAT: No lymphadenopathy. The esophagus is not dilated.   CHEST WALL AND LOWER NECK: No lymphadenopathy. The visualized thyroid is grossly unremarkable.   ABDOMEN:   LIVER: A few scattered calcified granulomas.   BILE DUCTS: Not dilated.   GALLBLADDER: No calcified stones or  definite inflammation.   PANCREAS: Unremarkable.   SPLEEN: Scattered calcified granulomas.   ADRENAL GLANDS: Unremarkable.   KIDNEYS AND URETERS: Normal in cortical thickness without hydronephrosis.   No focal renal lesions.   PELVIS:   BLADDER: Partially distended.   REPRODUCTIVE ORGANS: Uterus is present.   BOWEL: No findings of bowel inflammation or obstruction.   Unremarkable appendix.   Unremarkable mesentery.     VESSELS: See above. Major portal venous branches grossly appear patent. IVC and visualized major branches are patent.   PERITONEUM/RETROPERITONEUM/LYMPH NODES: No free fluid or free air. No abdominal or pelvic lymphadenopathy.   BONE AND SOFT TISSUE: No focal suspicious skeletal lesions.   Degenerative changes of the spine. Grade 1 anterolisthesis at L4-5.         CHEST: 1.  12 mm spiculated nodule in the left upper lobe, highly suspicious for primary neoplasm, especially in a patient with evidence of severe pulmonary emphysema. Suggest nonemergent referral for tissue sampling. No findings of metastatic disease of the thorax otherwise identified. 2. No pulmonary embolism identified. 3. No acute aortic pathology. There is extensive mixing artifact related to turbulent flow within the descending thoracic aorta, relating to diffuse dilatation compared to the ascending aorta with distal thoracic descending aortic aneurysm up to 4.1 cm in diameter. Otherwise no evidence of aortic dissection or other acute pathology identified. 4. Extensive endobronchial mucous plugging in the lower lobes. Otherwise no focal pulmonary infiltrates identified.   ABDOMEN-PELVIS: 1.  No definite acute findings. 2. Severe atherosclerosis with diffuse ectasia of the abdominal aorta up to 2.9 cm.     Signed by: Hayden Sears 3/1/2025 12:38 PM Dictation workstation:   [id number]    CT angio abdomen pelvis w and or wo IV IV contrast    Result Date: 3/1/2025  Interpreted By:  Hayden Sears, STUDY: CT ANGIO CHEST FOR PULMONARY  EMBOLISM; CT ANGIO ABDOMEN PELVIS W AND/OR WO IV IV CONTRAST;  3/1/2025 12:18 pm; 3/1/2025 12:17 pm   INDICATION: Signs/Symptoms:concern for PE/pneumonia; Signs/Symptoms:cp, possibly aortic pathology see on ct angio chest.   COMPARISON: None.   ACCESSION NUMBER(S): [id number]; [id number]   ORDERING CLINICIAN: MARK DOWD   TECHNIQUE: CT of the chest, abdomen, and pelvis was performed.  Contiguous axial images were obtained at 3 mm slice thickness through the chest, abdomen and pelvis. Coronal and sagittal reconstructions at 3mm slice thickness were performed. 75 mL Omnipaque 350 administered intravenously without immediate complication. MIP reformatted images were generated.   FINDINGS: CHEST:   LUNG/PLEURA/LARGE AIRWAYS: Severe emphysematous changes are present. There is a spiculated nodule in the left upper lobe anteriorly measuring 12 mm. A few nodules in the right lower lobe appears linear on orthogonal planes suggesting scars for example measuring 0.7 cm (Series 406, Image 190) and 0.5 cm (Series 406, Image 220). Calcified granulomas in the right lower lobe are also noted. Otherwise the lungs appear clear. No pleural effusion or pneumothorax Central airways are patent. There are areas endobronchial mucous plugging involving both lower lobes.   VESSELS: No pulmonary embolism identified. Severe coronary atherosclerosis. Severe thoracic aortic atherosclerosis. Ascending aorta is not significantly dilated measuring 34 mm. There is extensive mixing artifact throughout the descending thoracic aorta which is more dilated than the ascending aorta. The proximal descending aorta measures 36 mm and more distally there is a fusiform aneurysm measuring 41 mm. Aneurysm does not extend below the diaphragmatic hiatus.   The thoracic great vessels are tortuous but otherwise patent.   There is severe abdominal aortic atherosclerosis without aneurysm. The abdominal aorta is diffusely ectatic measuring up to 29 mm proximally.  The celiac, superior mesenteric, right renal, left renal, and inferior mesenteric arteries are patent.   There is severe atherosclerosis of the bilateral common, internal, and external iliac arteries which are otherwise patent without aneurysm.   HEART: Mild cardiomegaly. No significant pericardial effusion.   MEDIASTINUM AND MONSERRAT: No lymphadenopathy. The esophagus is not dilated.   CHEST WALL AND LOWER NECK: No lymphadenopathy. The visualized thyroid is grossly unremarkable.   ABDOMEN:   LIVER: A few scattered calcified granulomas.   BILE DUCTS: Not dilated.   GALLBLADDER: No calcified stones or definite inflammation.   PANCREAS: Unremarkable.   SPLEEN: Scattered calcified granulomas.   ADRENAL GLANDS: Unremarkable.   KIDNEYS AND URETERS: Normal in cortical thickness without hydronephrosis.   No focal renal lesions.   PELVIS:   BLADDER: Partially distended.   REPRODUCTIVE ORGANS: Uterus is present.   BOWEL: No findings of bowel inflammation or obstruction.   Unremarkable appendix.   Unremarkable mesentery.     VESSELS: See above. Major portal venous branches grossly appear patent. IVC and visualized major branches are patent.   PERITONEUM/RETROPERITONEUM/LYMPH NODES: No free fluid or free air. No abdominal or pelvic lymphadenopathy.   BONE AND SOFT TISSUE: No focal suspicious skeletal lesions.   Degenerative changes of the spine. Grade 1 anterolisthesis at L4-5.         CHEST: 1.  12 mm spiculated nodule in the left upper lobe, highly suspicious for primary neoplasm, especially in a patient with evidence of severe pulmonary emphysema. Suggest nonemergent referral for tissue sampling. No findings of metastatic disease of the thorax otherwise identified. 2. No pulmonary embolism identified. 3. No acute aortic pathology. There is extensive mixing artifact related to turbulent flow within the descending thoracic aorta, relating to diffuse dilatation compared to the ascending aorta with distal thoracic descending aortic  aneurysm up to 4.1 cm in diameter. Otherwise no evidence of aortic dissection or other acute pathology identified. 4. Extensive endobronchial mucous plugging in the lower lobes. Otherwise no focal pulmonary infiltrates identified.   ABDOMEN-PELVIS: 1.  No definite acute findings. 2. Severe atherosclerosis with diffuse ectasia of the abdominal aorta up to 2.9 cm.     Signed by: Hayden Sears 3/1/2025 12:38 PM Dictation workstation:   [id number]    XR chest 2 views    Result Date: 3/1/2025  Interpreted By:  Luz Hamlin, STUDY: XR CHEST 2 VIEWS 3/1/2025 10:50 am   INDICATION: Signs/Symptoms:pneumonia   COMPARISON: None available.   ACCESSION NUMBER(S): [id number]   ORDERING CLINICIAN: TAVON MAR   TECHNIQUE: PA and lateral views   FINDINGS: There are no infiltrates or effusions. There is a tortuous descending thoracic aorta. Pulmonary vascularity and cardiac silhouette appear normal. Spurring is seen throughout the thoracic spine.       No acute infiltrates. Tortuosity of the descending thoracic aorta noted.   Signed by: Luz Hamlin 3/1/2025 10:57 AM Dictation workstation:   [id number]            Assessment & Plan  Shortness of breath    COPD exacerbation (Multi)      Acute hypoxic respiratory failure/ COPD exacerbation- 4L oxygen wean as tolerated, schedule Duo-Neb every 4-6hrs, Rocephin, Azithromycin (both tolerated inpatient previously), Solu-medrol 40mg IV every 6 hrs. PRN Mucinex 600mg  Nicotine dependence- PRN nicotine patches, smoking cessation counseling  Elevated troponin- tending down, ECG not concerning, no CP  HTN/CAD- continue Amlodipine, Lisinopril, Metoprolol, Aspirin as prescribed  HLD- continue Atorvastatin as prescribed  IBS- continue Dicyclomine as prescribed  GERD- continue Pantoprazole as prescribed  Chronic pain syndrome- continue Tramadol as prescribed  MALS- surgery scheduled 03/19/25  Lupus anticoagulant  Anxiety/ Depression- continue escitalopram as prescribed   Pulmonary nodule- FU  for biopsy outpatient  Descending aortic aneurysm- FU outpatient       RICHARD RUVALCABA

## 2025-03-01 NOTE — ED PROVIDER NOTES
Emergency Department Provider Note        History of Present Illness     History provided by: Patient  Limitations to History: None    HPI:  Patient is a 79-year-old female history of COPD presented to the ED with shortness of breath.  Patient states that 2 weeks ago she was diagnosed with pneumonia at the time patient states that she left with oral antibiotics however states that she had a reaction to the antibiotics and therefore stopped taking them.  Patient states she only took 1 days worth of antibiotics.  Patient states since then she has increased shortness of breath.  With productive sputum and cough.  Patient is denying any chest pain.  Physical Exam   Triage vitals:  T 36.1 °C (96.9 °F)  HR 82  /77  RR 16  O2 96 % None (Room air)    Physical Exam  Constitutional:       Appearance: Normal appearance.   HENT:      Head: Normocephalic.   Eyes:      Extraocular Movements: Extraocular movements intact.   Cardiovascular:      Rate and Rhythm: Normal rate and regular rhythm.   Pulmonary:      Effort: Tachypnea present.      Breath sounds: Wheezing present.   Abdominal:      General: Abdomen is flat.      Palpations: Abdomen is soft.   Musculoskeletal:         General: Normal range of motion.      Cervical back: Normal range of motion.      Right lower leg: No tenderness. No edema.      Left lower leg: No tenderness. No edema.   Skin:     General: Skin is warm.   Neurological:      Mental Status: She is alert. Mental status is at baseline.   Psychiatric:         Mood and Affect: Mood normal.         Behavior: Behavior normal.          Medical Decision Making & ED Course   Medical Decision Making:  Patient is a 79-year-old female presenting to the ED with shortness of breath.  Patient states that she has a history of pneumonia which was not adequately treated 2 weeks ago.  Patient states cough with productive sputum and increased shortness of breath.  Patient states she has been using her 's  oxygen.  Patient presented to the ED hypoxic at 80 was placed on 4 L nasal cannula now satting at 95%.  Patient states that she has not smoked cigarettes in the past 2 days.  Patient was wheezy on my exam concerning for COPD exacerbation in the setting of pneumonia.  Will obtain a chest x-ray as well for possible pneumonia as well as a VBG.  Will treat for COPD exacerbation with DuoNebs, magnesium, Solu-Medrol.  Will obtain EKG and troponin for possible ACS, low concern for PE at this time as patient symptoms are consistent with pneumonia and COPD exacerbation however if workup is negative patient does not improve will likely obtain a CT PE if necessary.  Patient has no history of DVT or PE no unilateral leg swelling is denying any chest pain.  Please see ED course for further details         EKG Independent Interpretation:  Heart rate 60, QTc 480, right bundle branch block with T wave inversions in lead V1, V2, V3 which is consistent to previous EKGs.        The patient was discussed with the following consultants/services: Hospitalist/Admitting Provider who accepted the patient for admission      ED Course as of 03/01/25 1838   Sat Mar 01, 2025   1135 Patient flu and COVID RSV negative, no leukocytosis, electrolytes within normal limits, patient did have an elevated troponin of 50 8 repeat pending. [TS]   1136 VBG shows a pH of 7.37, CO2 49, lactate 1.3. [TS]   1136 Chest x-ray was negative for pneumonia however due to high suspicion and also concern for possible PE will obtain a CT PE [TS]   1322 CT showed no PE, pneumonia however does show left upper lobe mass concerning for cancer.  I spoke to patient who states that she was aware of the mass and was having a hard time getting into pulmonology appointments for or any evaluation.  Due to patient's new oxygen requirement, COPD and possible lung cancer finding.  Patient will be admitted for further workup. [TS]      ED Course User Index  [TS] Astrid Cedeño MD          Diagnoses as of 03/01/25 1838   Shortness of breath   COPD exacerbation (Multi)          Disposition   As a result of their workup, the patient will require admission to the hospital.  The patient was informed of her diagnosis.  The patient was given the opportunity to ask questions and I answered them. The patient agreed to be admitted to the hospital.    Procedures   Procedures    Patient seen and discussed with ED attending physician.    Astrid Cedeño MD  Emergency Medicine     Astrid Cedeño MD  Resident  03/01/25 1838

## 2025-03-01 NOTE — CARE PLAN
The patient's goals for the shift include  adequaate comfort    The clinical goals for the shift include safety and comfort

## 2025-03-02 VITALS
WEIGHT: 106 LBS | SYSTOLIC BLOOD PRESSURE: 147 MMHG | HEART RATE: 67 BPM | RESPIRATION RATE: 21 BRPM | TEMPERATURE: 97.9 F | BODY MASS INDEX: 18.1 KG/M2 | OXYGEN SATURATION: 96 % | HEIGHT: 64 IN | DIASTOLIC BLOOD PRESSURE: 66 MMHG

## 2025-03-02 PROCEDURE — S4991 NICOTINE PATCH NONLEGEND: HCPCS | Performed by: STUDENT IN AN ORGANIZED HEALTH CARE EDUCATION/TRAINING PROGRAM

## 2025-03-02 PROCEDURE — 1200000002 HC GENERAL ROOM WITH TELEMETRY DAILY

## 2025-03-02 PROCEDURE — 2500000001 HC RX 250 WO HCPCS SELF ADMINISTERED DRUGS (ALT 637 FOR MEDICARE OP): Performed by: INTERNAL MEDICINE

## 2025-03-02 PROCEDURE — 2500000002 HC RX 250 W HCPCS SELF ADMINISTERED DRUGS (ALT 637 FOR MEDICARE OP, ALT 636 FOR OP/ED): Performed by: INTERNAL MEDICINE

## 2025-03-02 PROCEDURE — 99232 SBSQ HOSP IP/OBS MODERATE 35: CPT | Performed by: INTERNAL MEDICINE

## 2025-03-02 PROCEDURE — 2500000005 HC RX 250 GENERAL PHARMACY W/O HCPCS: Performed by: INTERNAL MEDICINE

## 2025-03-02 PROCEDURE — 94640 AIRWAY INHALATION TREATMENT: CPT

## 2025-03-02 PROCEDURE — 2500000004 HC RX 250 GENERAL PHARMACY W/ HCPCS (ALT 636 FOR OP/ED): Performed by: INTERNAL MEDICINE

## 2025-03-02 PROCEDURE — 2500000002 HC RX 250 W HCPCS SELF ADMINISTERED DRUGS (ALT 637 FOR MEDICARE OP, ALT 636 FOR OP/ED): Performed by: STUDENT IN AN ORGANIZED HEALTH CARE EDUCATION/TRAINING PROGRAM

## 2025-03-02 RX ADMIN — LISINOPRIL 40 MG: 40 TABLET ORAL at 09:17

## 2025-03-02 RX ADMIN — TRAZODONE HYDROCHLORIDE 100 MG: 50 TABLET ORAL at 20:19

## 2025-03-02 RX ADMIN — METOPROLOL SUCCINATE 50 MG: 50 TABLET, EXTENDED RELEASE ORAL at 09:17

## 2025-03-02 RX ADMIN — AMLODIPINE BESYLATE 5 MG: 5 TABLET ORAL at 09:17

## 2025-03-02 RX ADMIN — IPRATROPIUM BROMIDE AND ALBUTEROL SULFATE 3 ML: .5; 3 SOLUTION RESPIRATORY (INHALATION) at 19:21

## 2025-03-02 RX ADMIN — NICOTINE 1 PATCH: 14 PATCH, EXTENDED RELEASE TRANSDERMAL at 09:18

## 2025-03-02 RX ADMIN — FORMOTEROL FUMARATE DIHYDRATE 20 MCG: 20 SOLUTION RESPIRATORY (INHALATION) at 19:21

## 2025-03-02 RX ADMIN — FORMOTEROL FUMARATE DIHYDRATE 20 MCG: 20 SOLUTION RESPIRATORY (INHALATION) at 07:27

## 2025-03-02 RX ADMIN — GUAIFENESIN 1200 MG: 600 TABLET ORAL at 20:20

## 2025-03-02 RX ADMIN — Medication 2 L/MIN: at 11:09

## 2025-03-02 RX ADMIN — CEFTRIAXONE SODIUM 1 G: 1 INJECTION, SOLUTION INTRAVENOUS at 09:44

## 2025-03-02 RX ADMIN — METHYLPREDNISOLONE SODIUM SUCCINATE 40 MG: 40 INJECTION, POWDER, FOR SOLUTION INTRAMUSCULAR; INTRAVENOUS at 06:21

## 2025-03-02 RX ADMIN — IPRATROPIUM BROMIDE AND ALBUTEROL SULFATE 3 ML: .5; 3 SOLUTION RESPIRATORY (INHALATION) at 07:21

## 2025-03-02 RX ADMIN — GABAPENTIN 300 MG: 300 CAPSULE ORAL at 20:20

## 2025-03-02 RX ADMIN — ATORVASTATIN CALCIUM 80 MG: 80 TABLET, FILM COATED ORAL at 09:17

## 2025-03-02 RX ADMIN — TIOTROPIUM BROMIDE INHALATION SPRAY 2 PUFF: 3.12 SPRAY, METERED RESPIRATORY (INHALATION) at 07:32

## 2025-03-02 RX ADMIN — ASPIRIN 81 MG: 81 TABLET, COATED ORAL at 09:18

## 2025-03-02 RX ADMIN — IPRATROPIUM BROMIDE AND ALBUTEROL SULFATE 3 ML: .5; 3 SOLUTION RESPIRATORY (INHALATION) at 13:18

## 2025-03-02 RX ADMIN — METHYLPREDNISOLONE SODIUM SUCCINATE 40 MG: 40 INJECTION, POWDER, FOR SOLUTION INTRAMUSCULAR; INTRAVENOUS at 00:06

## 2025-03-02 RX ADMIN — GUAIFENESIN 1200 MG: 600 TABLET ORAL at 09:17

## 2025-03-02 RX ADMIN — METHYLPREDNISOLONE SODIUM SUCCINATE 40 MG: 40 INJECTION, POWDER, FOR SOLUTION INTRAMUSCULAR; INTRAVENOUS at 11:52

## 2025-03-02 RX ADMIN — GABAPENTIN 300 MG: 300 CAPSULE ORAL at 09:18

## 2025-03-02 ASSESSMENT — COGNITIVE AND FUNCTIONAL STATUS - GENERAL
DAILY ACTIVITIY SCORE: 20
HELP NEEDED FOR BATHING: A LITTLE
WALKING IN HOSPITAL ROOM: A LITTLE
TOILETING: A LITTLE
DRESSING REGULAR UPPER BODY CLOTHING: A LITTLE
CLIMB 3 TO 5 STEPS WITH RAILING: A LITTLE
PERSONAL GROOMING: A LITTLE
MOBILITY SCORE: 22

## 2025-03-02 ASSESSMENT — PAIN SCALES - GENERAL
PAINLEVEL_OUTOF10: 0 - NO PAIN
PAINLEVEL_OUTOF10: 0 - NO PAIN

## 2025-03-02 ASSESSMENT — PAIN - FUNCTIONAL ASSESSMENT: PAIN_FUNCTIONAL_ASSESSMENT: 0-10

## 2025-03-02 NOTE — CARE PLAN
The patient's goals for the shift include      Problem: Skin  Goal: Prevent/minimize sheer/friction injuries  Outcome: Progressing  Flowsheets (Taken 3/2/2025 1107)  Prevent/minimize sheer/friction injuries:   HOB 30 degrees or less   Turn/reposition every 2 hours/use positioning/transfer devices     Problem: Respiratory  Goal: Clear secretions with interventions this shift  Outcome: Progressing  Flowsheets (Taken 3/2/2025 1107)  Clear secretions with interventions this shift: Encourage/provide pulmonary hygiene/secretion clearance     The clinical goals for the shift include pt will remain hemodynamically stable throughout shift.

## 2025-03-02 NOTE — PROGRESS NOTES
Radha Logan is a 79 y.o. female on day 1 of admission presenting with Shortness of breath.      Subjective   Ms. Radha Logan is a 80yo female on day 1 of admission with COPD exacerbation. She is doing better today no SOB unless she takes off oxygen to go to bathroom. Responding well to medication no N/V. No new complaints. Discussed the importance of smoking cessation- she plans to quit with her .        Objective     Last Recorded Vitals  /65   Pulse 52   Temp 36.2 °C (97.2 °F)   Resp 21   Wt 48.1 kg (106 lb)   SpO2 97%       Physical Exam  Pulmonary:      Breath sounds: Wheezing present.      Comments: 4L NC  Abdominal:      General: Bowel sounds are normal.      Tenderness: There is no abdominal tenderness.   Musculoskeletal:         General: No swelling.         Relevant Results  Scheduled medications  amLODIPine, 5 mg, oral, Daily  aspirin, 81 mg, oral, Daily  atorvastatin, 80 mg, oral, Daily  cefTRIAXone, 1 g, intravenous, Daily  tiotropium, 2 puff, inhalation, Daily   And  formoterol, 20 mcg, nebulization, q12h  gabapentin, 300 mg, oral, BID  guaiFENesin, 1,200 mg, oral, BID  ipratropium-albuteroL, 3 mL, nebulization, TID  lisinopril, 40 mg, oral, Daily  methylPREDNISolone sodium succinate (PF), 40 mg, intravenous, q12h  metoprolol succinate XL, 50 mg, oral, Daily  nicotine, 1 patch, transdermal, Daily  oxygen, , inhalation, Continuous - Inhalation  traZODone, 100 mg, oral, Nightly      PRN medications: benzonatate, ipratropium-albuteroL, LORazepam, traMADol    LABS  Results for orders placed or performed during the hospital encounter of 03/01/25 (from the past 24 hours)   Troponin, High Sensitivity, 1 Hour   Result Value Ref Range    Troponin I, High Sensitivity 49 (H) 0 - 13 ng/L               Assessment & Plan  Shortness of breath    Acute hypoxic respiratory failure/ COPD exacerbation- 4L oxygen wean as tolerated, decrease to Solu-medrol 40mg IV every 12 hrs, continue Duo-Neb every  4-6hrs, Rocephin, Azithromycin (both tolerated inpatient previously). PRN Mucinex 600mg  Nicotine dependence- PRN nicotine patches, smoking cessation counseling- pt voiced understanding and has plans to quit  Elevated troponin- tending down, ECG not concerning, no CP  HTN/CAD- continue Amlodipine, Lisinopril, Metoprolol, Aspirin as prescribed  HLD- continue Atorvastatin as prescribed  IBS- continue Dicyclomine as prescribed  GERD- continue Pantoprazole as prescribed  Chronic pain syndrome- continue Tramadol as prescribed  MALS- surgery scheduled 03/19/25  Lupus anticoagulant  Anxiety/ Depression- continue escitalopram as prescribed   Pulmonary nodule- FU for biopsy outpatient  Descending aortic aneurysm- FU outpatient      RICHARD STEELES

## 2025-03-03 ENCOUNTER — PHARMACY VISIT (OUTPATIENT)
Dept: PHARMACY | Facility: CLINIC | Age: 80
End: 2025-03-03
Payer: COMMERCIAL

## 2025-03-03 VITALS
BODY MASS INDEX: 18.1 KG/M2 | SYSTOLIC BLOOD PRESSURE: 137 MMHG | RESPIRATION RATE: 21 BRPM | DIASTOLIC BLOOD PRESSURE: 62 MMHG | WEIGHT: 106 LBS | TEMPERATURE: 96.8 F | HEIGHT: 64 IN | OXYGEN SATURATION: 96 % | HEART RATE: 62 BPM

## 2025-03-03 PROBLEM — J44.1 COPD EXACERBATION (MULTI): Status: RESOLVED | Noted: 2025-03-03 | Resolved: 2025-03-03

## 2025-03-03 PROBLEM — J44.1 COPD EXACERBATION (MULTI): Status: ACTIVE | Noted: 2025-03-03

## 2025-03-03 PROBLEM — R06.02 SHORTNESS OF BREATH: Status: RESOLVED | Noted: 2025-03-01 | Resolved: 2025-03-03

## 2025-03-03 LAB
ANION GAP SERPL CALC-SCNC: 11 MMOL/L (ref 10–20)
BUN SERPL-MCNC: 22 MG/DL (ref 6–23)
CALCIUM SERPL-MCNC: 9.5 MG/DL (ref 8.6–10.3)
CHLORIDE SERPL-SCNC: 101 MMOL/L (ref 98–107)
CO2 SERPL-SCNC: 30 MMOL/L (ref 21–32)
CREAT SERPL-MCNC: 0.9 MG/DL (ref 0.5–1.05)
EGFRCR SERPLBLD CKD-EPI 2021: 66 ML/MIN/1.73M*2
ERYTHROCYTE [DISTWIDTH] IN BLOOD BY AUTOMATED COUNT: 15 % (ref 11.5–14.5)
GLUCOSE SERPL-MCNC: 145 MG/DL (ref 74–99)
HCT VFR BLD AUTO: 41.3 % (ref 36–46)
HGB BLD-MCNC: 13.8 G/DL (ref 12–16)
MCH RBC QN AUTO: 30.7 PG (ref 26–34)
MCHC RBC AUTO-ENTMCNC: 33.4 G/DL (ref 32–36)
MCV RBC AUTO: 92 FL (ref 80–100)
NRBC BLD-RTO: 0 /100 WBCS (ref 0–0)
PLATELET # BLD AUTO: 294 X10*3/UL (ref 150–450)
POTASSIUM SERPL-SCNC: 4.5 MMOL/L (ref 3.5–5.3)
RBC # BLD AUTO: 4.49 X10*6/UL (ref 4–5.2)
SODIUM SERPL-SCNC: 137 MMOL/L (ref 136–145)
WBC # BLD AUTO: 23 X10*3/UL (ref 4.4–11.3)

## 2025-03-03 PROCEDURE — 2500000002 HC RX 250 W HCPCS SELF ADMINISTERED DRUGS (ALT 637 FOR MEDICARE OP, ALT 636 FOR OP/ED): Performed by: STUDENT IN AN ORGANIZED HEALTH CARE EDUCATION/TRAINING PROGRAM

## 2025-03-03 PROCEDURE — S4991 NICOTINE PATCH NONLEGEND: HCPCS | Performed by: STUDENT IN AN ORGANIZED HEALTH CARE EDUCATION/TRAINING PROGRAM

## 2025-03-03 PROCEDURE — 94640 AIRWAY INHALATION TREATMENT: CPT

## 2025-03-03 PROCEDURE — 94761 N-INVAS EAR/PLS OXIMETRY MLT: CPT

## 2025-03-03 PROCEDURE — 2500000001 HC RX 250 WO HCPCS SELF ADMINISTERED DRUGS (ALT 637 FOR MEDICARE OP): Performed by: INTERNAL MEDICINE

## 2025-03-03 PROCEDURE — 9420000001 HC RT PATIENT EDUCATION 5 MIN

## 2025-03-03 PROCEDURE — 2500000005 HC RX 250 GENERAL PHARMACY W/O HCPCS: Performed by: INTERNAL MEDICINE

## 2025-03-03 PROCEDURE — 80048 BASIC METABOLIC PNL TOTAL CA: CPT | Performed by: INTERNAL MEDICINE

## 2025-03-03 PROCEDURE — 2500000001 HC RX 250 WO HCPCS SELF ADMINISTERED DRUGS (ALT 637 FOR MEDICARE OP): Performed by: STUDENT IN AN ORGANIZED HEALTH CARE EDUCATION/TRAINING PROGRAM

## 2025-03-03 PROCEDURE — 36415 COLL VENOUS BLD VENIPUNCTURE: CPT | Performed by: INTERNAL MEDICINE

## 2025-03-03 PROCEDURE — 2500000002 HC RX 250 W HCPCS SELF ADMINISTERED DRUGS (ALT 637 FOR MEDICARE OP, ALT 636 FOR OP/ED): Performed by: INTERNAL MEDICINE

## 2025-03-03 PROCEDURE — RXMED WILLOW AMBULATORY MEDICATION CHARGE

## 2025-03-03 PROCEDURE — 2500000004 HC RX 250 GENERAL PHARMACY W/ HCPCS (ALT 636 FOR OP/ED): Mod: JZ | Performed by: INTERNAL MEDICINE

## 2025-03-03 PROCEDURE — 85027 COMPLETE CBC AUTOMATED: CPT | Performed by: INTERNAL MEDICINE

## 2025-03-03 PROCEDURE — 99239 HOSP IP/OBS DSCHRG MGMT >30: CPT | Performed by: INTERNAL MEDICINE

## 2025-03-03 RX ORDER — GUAIFENESIN 1200 MG/1
1200 TABLET, EXTENDED RELEASE ORAL 2 TIMES DAILY
Qty: 20 TABLET | Refills: 0 | Status: SHIPPED | OUTPATIENT
Start: 2025-03-03 | End: 2025-03-13

## 2025-03-03 RX ORDER — CEFDINIR 300 MG/1
300 CAPSULE ORAL 2 TIMES DAILY
Qty: 10 CAPSULE | Refills: 0 | Status: SHIPPED | OUTPATIENT
Start: 2025-03-03 | End: 2025-03-08

## 2025-03-03 RX ORDER — ACETAMINOPHEN 325 MG/1
650 TABLET ORAL EVERY 6 HOURS PRN
Status: DISCONTINUED | OUTPATIENT
Start: 2025-03-03 | End: 2025-03-03 | Stop reason: HOSPADM

## 2025-03-03 RX ORDER — PREDNISONE 20 MG/1
TABLET ORAL
Qty: 11 TABLET | Refills: 0 | Status: SHIPPED | OUTPATIENT
Start: 2025-03-03 | End: 2025-03-12

## 2025-03-03 RX ORDER — IBUPROFEN 200 MG
1 TABLET ORAL DAILY
Qty: 14 PATCH | Refills: 0 | Status: SHIPPED | OUTPATIENT
Start: 2025-03-04 | End: 2025-03-18

## 2025-03-03 RX ADMIN — METHYLPREDNISOLONE SODIUM SUCCINATE 40 MG: 40 INJECTION, POWDER, FOR SOLUTION INTRAMUSCULAR; INTRAVENOUS at 12:43

## 2025-03-03 RX ADMIN — METHYLPREDNISOLONE SODIUM SUCCINATE 40 MG: 40 INJECTION, POWDER, FOR SOLUTION INTRAMUSCULAR; INTRAVENOUS at 00:26

## 2025-03-03 RX ADMIN — ATORVASTATIN CALCIUM 80 MG: 80 TABLET, FILM COATED ORAL at 09:53

## 2025-03-03 RX ADMIN — IPRATROPIUM BROMIDE AND ALBUTEROL SULFATE 3 ML: .5; 3 SOLUTION RESPIRATORY (INHALATION) at 14:00

## 2025-03-03 RX ADMIN — GUAIFENESIN 1200 MG: 600 TABLET ORAL at 09:53

## 2025-03-03 RX ADMIN — AMLODIPINE BESYLATE 5 MG: 5 TABLET ORAL at 09:53

## 2025-03-03 RX ADMIN — TRAMADOL HYDROCHLORIDE 50 MG: 50 TABLET, COATED ORAL at 09:58

## 2025-03-03 RX ADMIN — TIOTROPIUM BROMIDE INHALATION SPRAY 2 PUFF: 3.12 SPRAY, METERED RESPIRATORY (INHALATION) at 08:13

## 2025-03-03 RX ADMIN — FORMOTEROL FUMARATE DIHYDRATE 20 MCG: 20 SOLUTION RESPIRATORY (INHALATION) at 08:10

## 2025-03-03 RX ADMIN — IPRATROPIUM BROMIDE AND ALBUTEROL SULFATE 3 ML: .5; 3 SOLUTION RESPIRATORY (INHALATION) at 08:06

## 2025-03-03 RX ADMIN — ACETAMINOPHEN 650 MG: 325 TABLET, FILM COATED ORAL at 04:10

## 2025-03-03 RX ADMIN — NICOTINE 1 PATCH: 14 PATCH, EXTENDED RELEASE TRANSDERMAL at 09:55

## 2025-03-03 RX ADMIN — ASPIRIN 81 MG: 81 TABLET, COATED ORAL at 09:54

## 2025-03-03 RX ADMIN — GABAPENTIN 300 MG: 300 CAPSULE ORAL at 09:54

## 2025-03-03 RX ADMIN — Medication 4 L/MIN: at 08:09

## 2025-03-03 RX ADMIN — LISINOPRIL 40 MG: 40 TABLET ORAL at 09:54

## 2025-03-03 RX ADMIN — METOPROLOL SUCCINATE 50 MG: 50 TABLET, EXTENDED RELEASE ORAL at 09:54

## 2025-03-03 ASSESSMENT — PAIN - FUNCTIONAL ASSESSMENT
PAIN_FUNCTIONAL_ASSESSMENT: 0-10
PAIN_FUNCTIONAL_ASSESSMENT: 0-10

## 2025-03-03 ASSESSMENT — PAIN DESCRIPTION - ORIENTATION: ORIENTATION: RIGHT;LEFT

## 2025-03-03 ASSESSMENT — PAIN SCALES - GENERAL
PAINLEVEL_OUTOF10: 8
PAINLEVEL_OUTOF10: 0 - NO PAIN

## 2025-03-03 ASSESSMENT — PAIN DESCRIPTION - LOCATION: LOCATION: HEAD

## 2025-03-03 NOTE — DISCHARGE SUMMARY
Discharge Diagnosis  Shortness of breath    Issues Requiring Follow-Up  Follow up with PCP.  Follow up with Pulmonology.  Follow up with Cardiothoracic surgery for stable aortic aneurysm.  Continue prednisone taper and antibiotics for 5 more days.  Continue using Oxygen as instructed.       Discharge Meds     Medication List      START taking these medications     cefdinir 300 mg capsule; Commonly known as: Omnicef; Take 1 capsule (300   mg) by mouth 2 times a day for 5 days.   guaiFENesin 1,200 mg tablet extended release 12hr; Commonly known as:   Mucinex; Take 1 tablet (1,200 mg) by mouth 2 times a day for 10 days. Do   not crush, chew, or split.   nicotine 14 mg/24 hr patch; Commonly known as: Nicoderm CQ; Place 1   patch over 24 hours on the skin once daily for 14 days.; Start taking on:   March 4, 2025   predniSONE 20 mg tablet; Commonly known as: Deltasone; Take 2 tablets   (40 mg) by mouth once daily for 3 days, THEN 1 tablet (20 mg) once daily   for 3 days, THEN 0.5 tablets (10 mg) once daily for 3 days.; Start taking   on: March 3, 2025     CONTINUE taking these medications     albuterol 90 mcg/actuation inhaler   amLODIPine 5 mg tablet; Commonly known as: Norvasc   Anoro Ellipta 62.5-25 mcg/actuation blister with device; Generic drug:   umeclidinium-vilanteroL   aspirin 81 mg EC tablet   atorvastatin 80 mg tablet; Commonly known as: Lipitor   benzonatate 200 mg capsule; Commonly known as: Tessalon   escitalopram 10 mg tablet; Commonly known as: Lexapro   gabapentin 300 mg capsule; Commonly known as: Neurontin   lisinopril 40 mg tablet   metoprolol succinate XL 50 mg 24 hr tablet; Commonly known as: Toprol-XL   traMADol 50 mg tablet; Commonly known as: Ultram   traZODone 50 mg tablet; Commonly known as: Desyrel     ASK your doctor about these medications     pantoprazole 40 mg EC tablet; Commonly known as: ProtoNix       Test Results Pending At Discharge  Pending Labs       No current pending labs.             Hospital Course   Radha Logan is a 79 y.o. female with a past medical history of COPD, HTN, HLD, RBBB, and MALS. presenting with shortness of breath for one month. Patient left hospital AMA 02/11/2025 with pneumonia. She was given Levaquin on release and states she only took it for 1 day due to N/V. She has not seen any improvement in symptoms stating she has been using her husbands oxygen 2L at home, without the oxygen she can not make it to the kitchen. She has a productive cough clear in color without hemoptysis. Denies N/V/D, congestion, sore throat, and CP.   Patient was admitted for COPD exacerbation and acute hypoxic respiratory failure requiring oxygen, there was also a concern of PNA due to basal infiltrates and mucus production, CT showed speculated nodule, she continued to improve, but she remains in need for oxygen, she was weaned down to 2 L, she remained HD stable, she was counseled multiple times about smoking cessation and the importance of cessation. She was also started on steroids. She wanted to switch her PCP, she was given a list to chose from , she was also given a referral to a lung doctor as well as a referral to lung nodule clinic, she was also given referral to cardiothoracic surgery for follow up on stable fusiform descending thoracic aorta aneurysm. She was tested for oxygen and she was requiring 2 L NC all the time. It was ordered and delivered for her prior to discharge.  Pt is stable and medically optimized for discharge and was advised to follow up with PCP and continue home meds. Discussed diagnosis and treatment in detail and answered all questions. Patient expressed understanding and agreed with plan.  Follow up with PCP.  Follow up with Pulmonology.  Follow up with Cardiothoracic surgery for stable aortic aneurysm.  Continue prednisone taper and antibiotics for 5 more days.  Continue using Oxygen as instructed.          Pertinent Physical Exam At Time of  Discharge  Physical Exam  Constitutional:       Appearance: Normal appearance. She is normal weight.   HENT:      Head: Normocephalic and atraumatic.      Right Ear: Tympanic membrane normal.      Left Ear: Tympanic membrane normal.      Nose: Nose normal.      Mouth/Throat:      Mouth: Mucous membranes are moist.      Pharynx: Oropharynx is clear.   Eyes:      General: No scleral icterus.        Right eye: No discharge.         Left eye: No discharge.      Extraocular Movements: Extraocular movements intact.      Conjunctiva/sclera: Conjunctivae normal.      Pupils: Pupils are equal, round, and reactive to light.   Neck:      Vascular: No carotid bruit.   Cardiovascular:      Rate and Rhythm: Normal rate and regular rhythm.      Pulses: Normal pulses.      Heart sounds: Normal heart sounds. No murmur heard.     No friction rub. No gallop.   Pulmonary:      Effort: Pulmonary effort is normal.      Breath sounds: Normal breath sounds.      Comments: Mild diminished air entry but stable fair  Abdominal:      General: Bowel sounds are normal. There is no distension.      Palpations: Abdomen is soft. There is no mass.      Tenderness: There is no abdominal tenderness. There is no right CVA tenderness, left CVA tenderness, guarding or rebound.      Hernia: No hernia is present.   Musculoskeletal:         General: No swelling, tenderness, deformity or signs of injury.      Cervical back: Normal range of motion and neck supple. No rigidity or tenderness.      Right lower leg: No edema.      Left lower leg: No edema.   Lymphadenopathy:      Cervical: No cervical adenopathy.   Skin:     General: Skin is warm.      Coloration: Skin is not jaundiced or pale.      Findings: No bruising, erythema, lesion or rash.   Neurological:      General: No focal deficit present.      Mental Status: She is alert and oriented to person, place, and time. Mental status is at baseline.   Psychiatric:         Mood and Affect: Mood normal.          Behavior: Behavior normal.         Thought Content: Thought content normal.         Judgment: Judgment normal.         Outpatient Follow-Up  No future appointments.    I spent > 30 minutes in patient care and discharge planning.     Haylie White, DO

## 2025-03-03 NOTE — DISCHARGE INSTRUCTIONS
Follow up with PCP.  Follow up with Pulmonology.  Follow up with Cardiothoracic surgery for stable aortic aneurysm.  Continue prednisone taper and antibiotics for 5 more days.  Continue using Oxygen as instructed.

## 2025-03-03 NOTE — DISCHARGE INSTR - APPOINTMENTS
Pulmonary Medicine Follow-Up Visit  Thursday, April 24 2025 at 2:15 pm  [facility]  [address], [address]  [phone]

## 2025-03-03 NOTE — CONSULTS
"Nutrition Initial Assessment:   Nutrition Assessment    Reason for Assessment: Admission nursing screening    Patient is a 79 y.o. female presenting with shortness of breath.       Nutrition History:  Energy Intake: Fair 50-75 %  Food and Nutrient History: Pt reports she was only eating ~50% of her usual intake about a year ago when she originally lost weight. States the last few months she has been eating less due to a decreased appetite and also abdominal pain from MALS, says sometimes she only takes a few bites of her meal. During admission, has two documented meal intakes of 75% in EMR. Denies N/V/D/C and issues chewing/swallowing. Has been drinking 1 boost per day at home.  Vitamin/Herbal Supplement Use: none listed in home med list       Anthropometrics:  Height: 162.6 cm (5' 4\")   Weight: 48.1 kg (106 lb)   BMI (Calculated): 18.19  IBW/kg (Dietitian Calculated): 54.5 kg          Weight History:   Wt Readings from Last 10 Encounters:   03/01/25 48.1 kg (106 lb)   2/11/25 47.6 kg ([facility])   11/16/22 51.7 kg (114 lb)   10/07/22 51.3 kg (113 lb)   09/28/22 50.8 kg (112 lb)      Weight Change %:  Weight History / % Weight Change: Pt reports ~40# wt loss over a 6 month time frame about 1 year ago but has been stable lately. Based on available wt records, pt had a ~4 kg wt loss from 2022 to 2025. It is possible that patient had significant wt gain prior to the reported 40# wt loss, although there are no weight records to confirm. Current weight suggests pt is underweight (BMI 18.2 kg/m2).  Significant Weight Loss: No    Nutrition Focused Physical Exam Findings:    Subcutaneous Fat Loss:   Orbital Fat Pads: Well nourished (slightly bulging fat pads)  Buccal Fat Pads: Well nourished (full, rounded cheeks)  Triceps: Well nourished (ample fat tissue)  Muscle Wasting:  Temporalis: Well nourished (well-defined muscle)  Pectoralis (Clavicular Region): Mild-Moderate (some protrusion of clavicle)  Deltoid/Trapezius: " Well nourished (rounded appearance at arm, shoulder, neck)  Interosseous: Well nourished (muscle bulges)  Edema:  Edema: none  Physical Findings:  Skin: Negative  Digestive System Findings: Abdominal pain    Nutrition Significant Labs:    Reviewed   Nutrition Specific Medications:  Reviewed     I/O:   Last BM Date: 03/28/25;      Dietary Orders (From admission, onward)       Start     Ordered    03/03/25 1223  Oral nutritional supplements  Until discontinued        Comments: chocolate   Question Answer Comment   Deliver with Breakfast    Deliver with Dinner    Select supplement: Ensure Plus High Protein        03/03/25 1222    03/01/25 1803  May Participate in Room Service  ( ROOM SERVICE MAY PARTICIPATE)  Once        Question:  .  Answer:  Yes    03/01/25 1803    03/01/25 1743  Adult diet Regular  Diet effective now        Question:  Diet type  Answer:  Regular    03/01/25 1743                     Estimated Needs:   Total Energy Estimated Needs in 24 hours (kCal): 1443 kCal  Method for Estimating Needs: 30 kcal/kg ABW  Total Protein Estimated Needs in 24 Hours (g): 58 g  Method for Estimating 24 Hour Protein Needs: 1.2 g/kg ABW  Total Fluid Estimated Needs in 24 Hours (mL): 1443 mL  Method for Estimating 24 Hour Fluid Needs: 1 ml/kcal or per MD  Patient on Order Fluid Restriction: No        Nutrition Diagnosis   Malnutrition Diagnosis  Patient has Malnutrition Diagnosis: No    Nutrition Diagnosis  Patient has Nutrition Diagnosis: Yes  Diagnosis Status (1): New  Nutrition Diagnosis 1: Inadequate oral intake  Related to (1): abdominal pain and decreased appetite  As Evidenced by (1): reports of eating less x several months       Nutrition Interventions/Recommendations   Nutrition prescription for oral nutrition    Nutrition Recommendations:  Individualized Nutrition Prescription Provided for : Regular diet with ONS    Nutrition Interventions/Goals:   Interventions: Meals and snacks, Medical food supplement  Meals  and Snacks: General healthful diet  Goal: Consumes 3 meals per day  Medical Food Supplement: Commercial beverage medical food supplement therapy  Goal: Ensure Plus High Protein BID (provides 350 kcal, 20 g protein per serving).      Education Documentation  Nutrition Related Education, taught by Maya Montiel RD at 3/3/2025  2:48 PM.  Learner: Patient  Readiness: Acceptance  Method: Explanation  Response: Verbalizes Understanding  Comment: Educated pt on importance of adequate energy and protein intake to prevent wt loss and malnutrition. Discussed increasing ONS at home to better meet nutrition needs. Also discussed less expensive options for ONS.              Nutrition Monitoring and Evaluation   Food/Nutrient Related History Monitoring  Monitoring and Evaluation Plan: Intake / amount of food, Estimated Energy Intake  Estimated Energy Intake: Energy intake greater or equal to 75% of estimated energy needs  Intake / Amount of food: Consumes at least 75% or more of meals/snacks/supplements, Meets > 75% estimated energy needs    Anthropometric Measurements  Monitoring and Evaluation Plan: Body weight  Body Weight: Body weight - Maintain stable weight         Physical Exam Findings  Monitoring and Evaluation Plan: Digestive System  Digestive System Finding: Abdominal pain  Criteria: Improvement in GI function/symptoms    Goal Status: New goal(s) identified    Time Spent (min): 60 minutes

## 2025-03-03 NOTE — PROGRESS NOTES
Date/Time SpO2 Medical Gas Therapy Medical Gas Delivery Method Oxygen L/min Patient is on During the Study Patient Activity During Study    03/03/25 1200 88 %  None (Room air)  --  --  At rest     03/03/25 1202 93 %  Supplemental oxygen  Nasal cannula  2 L/min  At rest     03/03/25 1205 86 %  None (Room air)  --  --  Ambulating     03/03/25 1206 96 %  Supplemental oxygen  Nasal cannula  2 L/min  Ambulating             Was a Home Oxygen Evaluation Performed? Yes  Based on the Home Oxygen Evaluation, Does the Patient Qualify for Home Oxygen Therapy? Yes    [employer] is the Patient's Preferred DME Company.  Was the DME Company Notified of Home Oxygen Therapy Needs? Yes    Recommendations:    Recommended Oxygen Dose at Rest is 2 L/min   Recommended Oxygen Dose with Activity is 2 L/min

## 2025-03-03 NOTE — PROGRESS NOTES
03/03/25 1240   Discharge Planning   Living Arrangements Spouse/significant other   Support Systems Spouse/significant other;Family members;Friends/neighbors   Type of Residence Private residence   Number of Stairs to Enter Residence 0   Number of Stairs Within Residence 0   Expected Discharge Disposition Home   Does the patient need discharge transport arranged? No     I met with patient at her bedside, verified insurance and demographics.  Patient lives with her .  She does not use mobility aids, denies falls, drives and is independent with ADLs.  She reported she did not use home O2 prior to admission but will require it after discharge; [employer] liaison arranging delivery.  Per patient, she has no PCP so I did deliver a list of PCPs that practice at [facility] per her request.  Care Coordination team following for assistance with discharge planning as needed.  Eliel STEIN TCC

## 2025-03-03 NOTE — NURSING NOTE
Pulmonary Disease Navigator Documentation:    Pulmonary disease education was performed by the Respiratory Therapist with a good understanding: yes  Home oxygen: none prior to admission; patient evaluated today and showing need for 2 lpm nc cont (see separate testing)  COPD triggers discussed and when to notify physician? yes  COPD Education booklet given to patient with education? no  Benefits of participating in a Pulmonary Rehab program discussed: yes  Pulmonary Rehab Referral written? Not at this time  Home medication usage education done? Yes, Albuterol MDI; Anoro.  Patient being discharged with script for Duoneb and home nebulizer machine.  Pursed lip breathing education done? yes  No PFTs on file for my review.    Comments: Patient stated she has been following with a PCP for any outpatient pulmonary needs.  Patient would like to follow with [facility] going forward.  Appointment made on her behalf for 4/24/25 2:15pm with NP, Radha Lo.  Appointment reminder provided to patient and placed on discharge instructions.  Outpatient PFT ordered by medical team and scheduling to contact patient.      This respiratory therapist met with patient to discuss smoking cessation. Patient educated on nicotine addiction, dangers associated with smoking, and risk factors associated with exposure to second hand smoke. Discussed patient's behavioral triggers for smoking, behavioral interventions for successful smoking cessation, and support group availability in the area. Patient has been advised that  hospitals are smoke-free facilities.

## 2025-03-05 ENCOUNTER — TELEPHONE (OUTPATIENT)
Dept: PULMONOLOGY | Facility: CLINIC | Age: 80
End: 2025-03-05
Payer: MEDICARE

## 2025-03-05 NOTE — TELEPHONE ENCOUNTER
03/05/2025 Called patient to find out where she wanted to be seen for follow-up as she had been seen at [facility] for lung nodules in the past. Including a PET CT in July of last year. Left her a message with my number to call back. Prashant Delacruz RN.

## 2025-03-06 ENCOUNTER — TELEPHONE (OUTPATIENT)
Dept: PRIMARY CARE | Facility: CLINIC | Age: 80
End: 2025-03-06
Payer: MEDICARE

## 2025-03-06 PROBLEM — I25.2 HISTORY OF NON-ST ELEVATION MYOCARDIAL INFARCTION (NSTEMI): Status: ACTIVE | Noted: 2021-12-08

## 2025-03-06 PROBLEM — M79.605 PAIN IN BOTH LOWER EXTREMITIES: Status: ACTIVE | Noted: 2019-04-09

## 2025-03-06 PROBLEM — F41.1 GAD (GENERALIZED ANXIETY DISORDER): Status: ACTIVE | Noted: 2024-01-14

## 2025-03-06 PROBLEM — Z96.1 PSEUDOPHAKIA, RIGHT EYE: Status: ACTIVE | Noted: 2018-03-28

## 2025-03-06 PROBLEM — I25.10 CORONARY ARTERY DISEASE INVOLVING NATIVE CORONARY ARTERY OF NATIVE HEART WITHOUT ANGINA PECTORIS: Status: ACTIVE | Noted: 2018-11-29

## 2025-03-06 PROBLEM — M54.9 BACK PAIN: Status: ACTIVE | Noted: 2025-03-06

## 2025-03-06 PROBLEM — R42 DIZZINESS: Status: ACTIVE | Noted: 2025-03-06

## 2025-03-06 PROBLEM — R10.13 EPIGASTRIC ABDOMINAL PAIN: Status: ACTIVE | Noted: 2019-08-27

## 2025-03-06 PROBLEM — J18.9 PNEUMONIA: Status: ACTIVE | Noted: 2024-11-15

## 2025-03-06 PROBLEM — D72.829 LEUCOCYTOSIS: Status: ACTIVE | Noted: 2018-11-09

## 2025-03-06 PROBLEM — I45.10 RBBB: Status: ACTIVE | Noted: 2025-03-06

## 2025-03-06 PROBLEM — Z86.0100 HISTORY OF COLONIC POLYPS: Status: ACTIVE | Noted: 2019-09-20

## 2025-03-06 PROBLEM — I71.23 ANEURYSM OF DESCENDING THORACIC AORTA WITHOUT RUPTURE (CMS-HCC): Status: ACTIVE | Noted: 2025-01-07

## 2025-03-06 PROBLEM — I95.9 HYPOTENSION: Status: ACTIVE | Noted: 2025-03-06

## 2025-03-06 PROBLEM — Z96.1 PSEUDOPHAKIA, LEFT EYE: Status: ACTIVE | Noted: 2018-03-28

## 2025-03-06 PROBLEM — I77.4 MEDIAN ARCUATE LIGAMENT SYNDROME (CMS-HCC): Status: ACTIVE | Noted: 2023-05-23

## 2025-03-06 PROBLEM — D68.9 DISORDER OF HEMOSTASIS (MULTI): Status: ACTIVE | Noted: 2018-05-25

## 2025-03-06 PROBLEM — I77.4 STENOSIS OF CELIAC ARTERY: Status: ACTIVE | Noted: 2025-03-06

## 2025-03-06 PROBLEM — F17.200 TOBACCO USE DISORDER: Status: ACTIVE | Noted: 2022-11-03

## 2025-03-06 PROBLEM — M79.604 PAIN IN BOTH LOWER EXTREMITIES: Status: ACTIVE | Noted: 2019-04-09

## 2025-03-06 RX ORDER — DICYCLOMINE HYDROCHLORIDE 10 MG/1
CAPSULE ORAL EVERY 6 HOURS PRN
COMMUNITY
Start: 2022-09-28

## 2025-03-06 RX ORDER — OMEPRAZOLE 40 MG/1
40 CAPSULE, DELAYED RELEASE ORAL
COMMUNITY
Start: 2024-07-02

## 2025-03-06 RX ORDER — VARENICLINE TARTRATE 0.5 (11)-1
KIT ORAL
COMMUNITY
Start: 2024-07-19

## 2025-03-06 RX ORDER — PROMETHAZINE HYDROCHLORIDE AND DEXTROMETHORPHAN HYDROBROMIDE 6.25; 15 MG/5ML; MG/5ML
5 SYRUP ORAL EVERY 4 HOURS PRN
COMMUNITY
Start: 2025-01-31 | End: 2025-03-13 | Stop reason: WASHOUT

## 2025-03-06 NOTE — TELEPHONE ENCOUNTER
Radha was agreeable to a virtual visit with the [facility] to start follow up care.

## 2025-03-07 ENCOUNTER — PATIENT OUTREACH (OUTPATIENT)
Dept: CARE COORDINATION | Age: 80
End: 2025-03-07
Payer: MEDICARE

## 2025-03-08 LAB
ATRIAL RATE: 60 BPM
P AXIS: 57 DEGREES
P OFFSET: 192 MS
P ONSET: 126 MS
PR INTERVAL: 192 MS
Q ONSET: 222 MS
QRS COUNT: 10 BEATS
QRS DURATION: 156 MS
QT INTERVAL: 480 MS
QTC CALCULATION(BAZETT): 480 MS
QTC FREDERICIA: 480 MS
R AXIS: 128 DEGREES
T AXIS: 8 DEGREES
T OFFSET: 462 MS
VENTRICULAR RATE: 60 BPM

## 2025-03-11 ENCOUNTER — TELEMEDICINE (OUTPATIENT)
Dept: PRIMARY CARE | Facility: CLINIC | Age: 80
End: 2025-03-11
Payer: MEDICARE

## 2025-03-11 ENCOUNTER — TELEPHONE (OUTPATIENT)
Dept: PRIMARY CARE | Facility: CLINIC | Age: 80
End: 2025-03-11
Payer: MEDICARE

## 2025-03-11 DIAGNOSIS — R91.1 PULMONARY NODULE 1 CM OR GREATER IN DIAMETER: ICD-10-CM

## 2025-03-11 DIAGNOSIS — R91.8 LUNG NODULES: Primary | ICD-10-CM

## 2025-03-11 DIAGNOSIS — R91.1 LUNG NODULE: ICD-10-CM

## 2025-03-11 PROCEDURE — 1126F AMNT PAIN NOTED NONE PRSNT: CPT | Performed by: NURSE PRACTITIONER

## 2025-03-11 PROCEDURE — 1111F DSCHRG MED/CURRENT MED MERGE: CPT | Performed by: NURSE PRACTITIONER

## 2025-03-11 PROCEDURE — 99202 OFFICE O/P NEW SF 15 MIN: CPT | Performed by: NURSE PRACTITIONER

## 2025-03-11 PROCEDURE — 1159F MED LIST DOCD IN RCRD: CPT | Performed by: NURSE PRACTITIONER

## 2025-03-11 SDOH — ECONOMIC STABILITY: FOOD INSECURITY: WITHIN THE PAST 12 MONTHS, THE FOOD YOU BOUGHT JUST DIDN'T LAST AND YOU DIDN'T HAVE MONEY TO GET MORE.: NEVER TRUE

## 2025-03-11 SDOH — ECONOMIC STABILITY: FOOD INSECURITY: WITHIN THE PAST 12 MONTHS, YOU WORRIED THAT YOUR FOOD WOULD RUN OUT BEFORE YOU GOT MONEY TO BUY MORE.: NEVER TRUE

## 2025-03-11 ASSESSMENT — ENCOUNTER SYMPTOMS
DEPRESSION: 1
OCCASIONAL FEELINGS OF UNSTEADINESS: 0
LOSS OF SENSATION IN FEET: 0

## 2025-03-11 ASSESSMENT — LIFESTYLE VARIABLES
AUDIT-C TOTAL SCORE: 0
HOW OFTEN DO YOU HAVE A DRINK CONTAINING ALCOHOL: NEVER
SKIP TO QUESTIONS 9-10: 1
HOW OFTEN DO YOU HAVE SIX OR MORE DRINKS ON ONE OCCASION: NEVER
HOW MANY STANDARD DRINKS CONTAINING ALCOHOL DO YOU HAVE ON A TYPICAL DAY: PATIENT DOES NOT DRINK

## 2025-03-11 ASSESSMENT — PAIN SCALES - GENERAL: PAINLEVEL_OUTOF10: 0-NO PAIN

## 2025-03-11 NOTE — PATIENT INSTRUCTIONS
12 mm spiculated nodule in the left upper lobe, highly suspicious for primary neoplasm, especially in a patient with evidence of severe pulmonary emphysema. Suggest nonemergent referral for tissue sampling.   Referral placed for thoracic surgery - Dr. Jamison      Lung Nodule Clinic  [facility].  [facility] [address]  [place], [place] [address]  Phone [phone]  Fax [phone]  Nurse Coordinator [phone]                                          Welcome to the [facility] Lung Nodule Clinic    Today was the initial consult with the lung nodule clinic to determine proper recommendations for follow up. Your care is coordinated to ensure timely management.  As you know, early detection of cancer is very important.  Nodules that are large, look suspicious or have changed over time is why further evaluation such as the additional imaging test that we have ordered is needed. Our clinic will work closely with you in choosing the best next step.       What is my next step?  We will assist with scheduling scans, results reviews, and referrals for priority appointments.      Who do I call?  Your care coordinator for the lung nodule clinic can be contacted at [phone]  All scheduling needs can be assisted within the Cardiac Surgery/Thoracic Surgery/Lung Nodule Clinic offices at [phone].              Table  Patricia H, Kelly DP, Jamarcus MONDRAGON, et al. Guidelines for Management of Incidental Pulmonary Nodules Detected on CT Images: From the Fleischner Society 2017. Radiology 2017;284:228-243.

## 2025-03-11 NOTE — PROGRESS NOTES
Subjective   Patient ID: Radha Logan is a 79 y.o. female who presents for New Patient Visit (Radha Logan has a new visit regarding a lung nodule. She quit smoking two week ago and is wearing a patch.  She smoked for 50 years one pack per day.  No personal history of cancer. Two sisters had cancer and a niece had cancer.  /).  HPI 79-year-old female presents today for lung nodule clinic.    She quit smoking two week ago and is wearing a patch.  She smoked for 50 years one pack per day.  No personal history of cancer. Two sisters had cancer and a niece had cancer.     Telehealth visit between Radha Logan  and Molly Quinn CNP at [facility] lung nodule clinic per patient request.    CT of the chest abdomen and pelvis 3/1/2025  Severe emphysematous changes are present.  There is a spiculated nodule in the left upper lobe anteriorly  measuring 12 mm. A few nodules in the right lower lobe appears linear on orthogonal planes suggesting scars for example measuring 0.7 cm(Series 406, Image 190) and 0.5 cm (Series 406, Image 220). Calcified granulomas in the right lower lobe are also noted. Otherwise the lungs appear clear. No pleural effusion or pneumothorax  Central airways are patent. There are areas endobronchial mucous  plugging involving both lower lobes.    10/18/2024 CT chest without IV contrast.  Centrilobular and paraseptal emphysema is present. Scarring/atelectasis in the right upper and middle lobes as well as the lung bases bilaterally.     Nodules: Numerous nodules are present. Some of the nodules noted on prior CT from 6/24/2024 have resolved suggestive of prior infectious/inflammatory etiology. Several new nodules when compared to the prior examinations. For example:     *  Solid subpleural nodule in the right lower lobe measuring 8 mm, stable (Series 3, Image 187).   *  Solid pleural-based nodule in the right lower lobe measuring 6 mm, stable (Series 3, Image 217).   *  Solid subpleural  nodule in the right lower lobe measuring 6 mm, new (Series 3, Image 205).   *  Solid 4 mm nodules in the superior aspect of the left upper lobe, new (both best seen on Series 3, Image 168).   Redemonstration of numerous bilateral pulmonary nodules measuring up to 8 mm. Some of these nodules are new from previously while others are stable. Additionally, some nodules from prior examination have resolved suggestive of prior infectious/inflammatory etiology. Follow-up chest CT in 3-6 months should be considered to document stability of new nodules detailed above.   Mediastinal lymphadenopathy, stable.   Severe emphysema.   Aneurysmal dilation of the descending thoracic aorta, stable.   Review of Systems  Review of systems: Present-feeling well. Not present-chills, fatigue and fever.  Respiratory: Not present-difficulty breathing, cough, bloody sputum.  Cardiovascular: Not present-chest pain, palpitations, dyspnea on exertion.  Objective   There were no vitals taken for this visit.     Physical Exam  Gen.: Mental status-alert. Gen. appearance-cooperative, well groomed and consistent with stated age. Not in acute distress or sickly. Orientation-oriented to time, place, purpose and person. Build and nutrition-well-nourished and well-developed.     Assessment/Plan   Diagnoses and all orders for this visit:  Lung nodules  -     Referral to Thoracic Surgery; Future  Lung nodule  -     Referral to Lung Nodule Center  Pulmonary nodule 1 cm or greater in diameter  -     Referral to Thoracic Surgery; Future

## 2025-04-03 ENCOUNTER — APPOINTMENT (OUTPATIENT)
Dept: CARDIOLOGY | Facility: HOSPITAL | Age: 80
DRG: 190 | End: 2025-04-03
Payer: MEDICARE

## 2025-04-03 ENCOUNTER — APPOINTMENT (OUTPATIENT)
Dept: RADIOLOGY | Facility: HOSPITAL | Age: 80
DRG: 190 | End: 2025-04-03
Payer: MEDICARE

## 2025-04-03 ENCOUNTER — HOSPITAL ENCOUNTER (INPATIENT)
Facility: HOSPITAL | Age: 80
End: 2025-04-03
Attending: EMERGENCY MEDICINE | Admitting: INTERNAL MEDICINE
Payer: COMMERCIAL

## 2025-04-03 DIAGNOSIS — Z72.0 TOBACCO ABUSE: ICD-10-CM

## 2025-04-03 DIAGNOSIS — F41.1 GAD (GENERALIZED ANXIETY DISORDER): ICD-10-CM

## 2025-04-03 DIAGNOSIS — R06.00 DYSPNEA, UNSPECIFIED: ICD-10-CM

## 2025-04-03 DIAGNOSIS — R10.13 EPIGASTRIC ABDOMINAL PAIN: ICD-10-CM

## 2025-04-03 DIAGNOSIS — J44.1 COPD EXACERBATION (MULTI): ICD-10-CM

## 2025-04-03 DIAGNOSIS — I10 BENIGN ESSENTIAL HTN: ICD-10-CM

## 2025-04-03 DIAGNOSIS — R06.02 SHORTNESS OF BREATH: Primary | ICD-10-CM

## 2025-04-03 DIAGNOSIS — R09.02 HYPOXIA: ICD-10-CM

## 2025-04-03 DIAGNOSIS — R06.09 OTHER FORMS OF DYSPNEA: ICD-10-CM

## 2025-04-03 PROBLEM — R06.2 WHEEZING: Status: ACTIVE | Noted: 2025-04-03

## 2025-04-03 PROBLEM — R06.82 TACHYPNEA: Status: ACTIVE | Noted: 2025-04-03

## 2025-04-03 LAB
ALBUMIN SERPL BCP-MCNC: 4.2 G/DL (ref 3.4–5)
ALP SERPL-CCNC: 80 U/L (ref 33–136)
ALT SERPL W P-5'-P-CCNC: 16 U/L (ref 7–45)
ANION GAP BLDV CALCULATED.4IONS-SCNC: 7 MMOL/L (ref 10–25)
ANION GAP SERPL CALC-SCNC: 15 MMOL/L (ref 10–20)
AST SERPL W P-5'-P-CCNC: 18 U/L (ref 9–39)
BASE EXCESS BLDV CALC-SCNC: 3.3 MMOL/L (ref -2–3)
BASOPHILS # BLD AUTO: 0.14 X10*3/UL (ref 0–0.1)
BASOPHILS NFR BLD AUTO: 1.1 %
BILIRUB SERPL-MCNC: 0.5 MG/DL (ref 0–1.2)
BNP SERPL-MCNC: 118 PG/ML (ref 0–99)
BODY TEMPERATURE: 37 DEGREES CELSIUS
BUN SERPL-MCNC: 19 MG/DL (ref 6–23)
CA-I BLDV-SCNC: 1.17 MMOL/L (ref 1.1–1.33)
CALCIUM SERPL-MCNC: 9.7 MG/DL (ref 8.6–10.3)
CARDIAC TROPONIN I PNL SERPL HS: 5 NG/L (ref 0–13)
CHLORIDE BLDV-SCNC: 101 MMOL/L (ref 98–107)
CHLORIDE SERPL-SCNC: 100 MMOL/L (ref 98–107)
CO2 SERPL-SCNC: 25 MMOL/L (ref 21–32)
CREAT SERPL-MCNC: 0.68 MG/DL (ref 0.5–1.05)
CRITICAL CALL TIME: 1750
CRITICAL CALLED BY: ABNORMAL
CRITICAL CALLED TO: ABNORMAL
CRITICAL READ BACK: ABNORMAL
EGFRCR SERPLBLD CKD-EPI 2021: 90 ML/MIN/1.73M*2
EOSINOPHIL # BLD AUTO: 0.39 X10*3/UL (ref 0–0.4)
EOSINOPHIL NFR BLD AUTO: 3 %
ERYTHROCYTE [DISTWIDTH] IN BLOOD BY AUTOMATED COUNT: 14.4 % (ref 11.5–14.5)
FLUAV RNA RESP QL NAA+PROBE: NOT DETECTED
FLUBV RNA RESP QL NAA+PROBE: NOT DETECTED
GLUCOSE BLDV-MCNC: 133 MG/DL (ref 74–99)
GLUCOSE SERPL-MCNC: 129 MG/DL (ref 74–99)
HCO3 BLDV-SCNC: 29.6 MMOL/L (ref 22–26)
HCT VFR BLD AUTO: 47.4 % (ref 36–46)
HCT VFR BLD EST: 46 % (ref 36–46)
HGB BLD-MCNC: 14.8 G/DL (ref 12–16)
HGB BLDV-MCNC: 15.2 G/DL (ref 12–16)
IMM GRANULOCYTES # BLD AUTO: 0.05 X10*3/UL (ref 0–0.5)
IMM GRANULOCYTES NFR BLD AUTO: 0.4 % (ref 0–0.9)
INHALED O2 CONCENTRATION: 36 %
LACTATE BLDV-SCNC: 1 MMOL/L (ref 0.4–2)
LYMPHOCYTES # BLD AUTO: 1.45 X10*3/UL (ref 0.8–3)
LYMPHOCYTES NFR BLD AUTO: 11 %
MAGNESIUM SERPL-MCNC: 1.85 MG/DL (ref 1.6–2.4)
MCH RBC QN AUTO: 30.2 PG (ref 26–34)
MCHC RBC AUTO-ENTMCNC: 31.2 G/DL (ref 32–36)
MCV RBC AUTO: 97 FL (ref 80–100)
MONOCYTES # BLD AUTO: 1.12 X10*3/UL (ref 0.05–0.8)
MONOCYTES NFR BLD AUTO: 8.5 %
NEUTROPHILS # BLD AUTO: 10.07 X10*3/UL (ref 1.6–5.5)
NEUTROPHILS NFR BLD AUTO: 76 %
NRBC BLD-RTO: 0 /100 WBCS (ref 0–0)
OXYHGB MFR BLDV: 84.1 % (ref 45–75)
PCO2 BLDV: 50 MM HG (ref 41–51)
PH BLDV: 7.38 PH (ref 7.33–7.43)
PLATELET # BLD AUTO: 308 X10*3/UL (ref 150–450)
PO2 BLDV: 56 MM HG (ref 35–45)
POTASSIUM BLDV-SCNC: 6.1 MMOL/L (ref 3.5–5.3)
POTASSIUM SERPL-SCNC: 4 MMOL/L (ref 3.5–5.3)
PROT SERPL-MCNC: 7 G/DL (ref 6.4–8.2)
RBC # BLD AUTO: 4.9 X10*6/UL (ref 4–5.2)
RSV RNA RESP QL NAA+PROBE: NOT DETECTED
SAO2 % BLDV: 86 % (ref 45–75)
SARS-COV-2 RNA RESP QL NAA+PROBE: NOT DETECTED
SODIUM BLDV-SCNC: 131 MMOL/L (ref 136–145)
SODIUM SERPL-SCNC: 136 MMOL/L (ref 136–145)
WBC # BLD AUTO: 13.2 X10*3/UL (ref 4.4–11.3)

## 2025-04-03 PROCEDURE — 84132 ASSAY OF SERUM POTASSIUM: CPT | Performed by: NURSE PRACTITIONER

## 2025-04-03 PROCEDURE — 2500000001 HC RX 250 WO HCPCS SELF ADMINISTERED DRUGS (ALT 637 FOR MEDICARE OP): Performed by: PHYSICIAN ASSISTANT

## 2025-04-03 PROCEDURE — 82435 ASSAY OF BLOOD CHLORIDE: CPT | Performed by: NURSE PRACTITIONER

## 2025-04-03 PROCEDURE — 84484 ASSAY OF TROPONIN QUANT: CPT | Performed by: NURSE PRACTITIONER

## 2025-04-03 PROCEDURE — 87637 SARSCOV2&INF A&B&RSV AMP PRB: CPT | Performed by: NURSE PRACTITIONER

## 2025-04-03 PROCEDURE — 71045 X-RAY EXAM CHEST 1 VIEW: CPT | Performed by: RADIOLOGY

## 2025-04-03 PROCEDURE — G0378 HOSPITAL OBSERVATION PER HR: HCPCS

## 2025-04-03 PROCEDURE — 83880 ASSAY OF NATRIURETIC PEPTIDE: CPT | Performed by: NURSE PRACTITIONER

## 2025-04-03 PROCEDURE — 36415 COLL VENOUS BLD VENIPUNCTURE: CPT | Performed by: NURSE PRACTITIONER

## 2025-04-03 PROCEDURE — 2500000005 HC RX 250 GENERAL PHARMACY W/O HCPCS: Performed by: PHYSICIAN ASSISTANT

## 2025-04-03 PROCEDURE — 85025 COMPLETE CBC W/AUTO DIFF WBC: CPT | Performed by: NURSE PRACTITIONER

## 2025-04-03 PROCEDURE — 96372 THER/PROPH/DIAG INJ SC/IM: CPT | Performed by: PHYSICIAN ASSISTANT

## 2025-04-03 PROCEDURE — 2500000004 HC RX 250 GENERAL PHARMACY W/ HCPCS (ALT 636 FOR OP/ED): Performed by: PHYSICIAN ASSISTANT

## 2025-04-03 PROCEDURE — 2500000002 HC RX 250 W HCPCS SELF ADMINISTERED DRUGS (ALT 637 FOR MEDICARE OP, ALT 636 FOR OP/ED): Performed by: NURSE PRACTITIONER

## 2025-04-03 PROCEDURE — 99223 1ST HOSP IP/OBS HIGH 75: CPT | Performed by: PHYSICIAN ASSISTANT

## 2025-04-03 PROCEDURE — 99285 EMERGENCY DEPT VISIT HI MDM: CPT | Performed by: EMERGENCY MEDICINE

## 2025-04-03 PROCEDURE — 94640 AIRWAY INHALATION TREATMENT: CPT

## 2025-04-03 PROCEDURE — 2500000004 HC RX 250 GENERAL PHARMACY W/ HCPCS (ALT 636 FOR OP/ED): Mod: JZ | Performed by: NURSE PRACTITIONER

## 2025-04-03 PROCEDURE — 93005 ELECTROCARDIOGRAM TRACING: CPT

## 2025-04-03 PROCEDURE — 84295 ASSAY OF SERUM SODIUM: CPT | Performed by: NURSE PRACTITIONER

## 2025-04-03 PROCEDURE — 71045 X-RAY EXAM CHEST 1 VIEW: CPT

## 2025-04-03 PROCEDURE — 96374 THER/PROPH/DIAG INJ IV PUSH: CPT | Mod: 59

## 2025-04-03 PROCEDURE — 83735 ASSAY OF MAGNESIUM: CPT | Performed by: NURSE PRACTITIONER

## 2025-04-03 RX ORDER — AMLODIPINE BESYLATE 5 MG/1
5 TABLET ORAL DAILY
Status: DISPENSED | OUTPATIENT
Start: 2025-04-04

## 2025-04-03 RX ORDER — ASPIRIN 81 MG/1
81 TABLET ORAL DAILY
Status: DISPENSED | OUTPATIENT
Start: 2025-04-04

## 2025-04-03 RX ORDER — IPRATROPIUM BROMIDE AND ALBUTEROL SULFATE 2.5; .5 MG/3ML; MG/3ML
3 SOLUTION RESPIRATORY (INHALATION) ONCE
Status: COMPLETED | OUTPATIENT
Start: 2025-04-03 | End: 2025-04-03

## 2025-04-03 RX ORDER — ACETAMINOPHEN 650 MG/1
650 SUPPOSITORY RECTAL EVERY 4 HOURS PRN
Status: ACTIVE | OUTPATIENT
Start: 2025-04-03

## 2025-04-03 RX ORDER — BENZONATATE 100 MG/1
200 CAPSULE ORAL 3 TIMES DAILY PRN
Status: ACTIVE | OUTPATIENT
Start: 2025-04-03

## 2025-04-03 RX ORDER — TRAMADOL HYDROCHLORIDE 50 MG/1
50 TABLET ORAL EVERY 6 HOURS PRN
Status: DISPENSED | OUTPATIENT
Start: 2025-04-03

## 2025-04-03 RX ORDER — IPRATROPIUM BROMIDE AND ALBUTEROL SULFATE 2.5; .5 MG/3ML; MG/3ML
3 SOLUTION RESPIRATORY (INHALATION) EVERY 4 HOURS PRN
Status: DISCONTINUED | OUTPATIENT
Start: 2025-04-03 | End: 2025-04-03

## 2025-04-03 RX ORDER — ENOXAPARIN SODIUM 100 MG/ML
40 INJECTION SUBCUTANEOUS EVERY 24 HOURS
Status: DISPENSED | OUTPATIENT
Start: 2025-04-03

## 2025-04-03 RX ORDER — HYDROXYZINE HYDROCHLORIDE 25 MG/1
25 TABLET, FILM COATED ORAL EVERY 8 HOURS PRN
Status: DISPENSED | OUTPATIENT
Start: 2025-04-03

## 2025-04-03 RX ORDER — TALC
3 POWDER (GRAM) TOPICAL NIGHTLY PRN
Status: DISPENSED | OUTPATIENT
Start: 2025-04-03

## 2025-04-03 RX ORDER — ATORVASTATIN CALCIUM 80 MG/1
80 TABLET, FILM COATED ORAL DAILY
Status: DISPENSED | OUTPATIENT
Start: 2025-04-04

## 2025-04-03 RX ORDER — ACETAMINOPHEN 325 MG/1
650 TABLET ORAL EVERY 4 HOURS PRN
Status: ACTIVE | OUTPATIENT
Start: 2025-04-03

## 2025-04-03 RX ORDER — ESCITALOPRAM OXALATE 10 MG/1
10 TABLET ORAL DAILY
Status: DISPENSED | OUTPATIENT
Start: 2025-04-04

## 2025-04-03 RX ORDER — GABAPENTIN 300 MG/1
300 CAPSULE ORAL 2 TIMES DAILY
Status: DISPENSED | OUTPATIENT
Start: 2025-04-03

## 2025-04-03 RX ORDER — ONDANSETRON 4 MG/1
4 TABLET, FILM COATED ORAL EVERY 8 HOURS PRN
Status: DISPENSED | OUTPATIENT
Start: 2025-04-03

## 2025-04-03 RX ORDER — ALBUTEROL SULFATE 0.83 MG/ML
2.5 SOLUTION RESPIRATORY (INHALATION) ONCE
Status: COMPLETED | OUTPATIENT
Start: 2025-04-03 | End: 2025-04-03

## 2025-04-03 RX ORDER — ONDANSETRON HYDROCHLORIDE 2 MG/ML
4 INJECTION, SOLUTION INTRAVENOUS EVERY 8 HOURS PRN
Status: DISPENSED | OUTPATIENT
Start: 2025-04-03

## 2025-04-03 RX ORDER — METOPROLOL SUCCINATE 50 MG/1
50 TABLET, EXTENDED RELEASE ORAL DAILY
Status: DISPENSED | OUTPATIENT
Start: 2025-04-04

## 2025-04-03 RX ORDER — ACETAMINOPHEN 160 MG/5ML
650 SOLUTION ORAL EVERY 4 HOURS PRN
Status: ACTIVE | OUTPATIENT
Start: 2025-04-03

## 2025-04-03 RX ORDER — TRAZODONE HYDROCHLORIDE 50 MG/1
100 TABLET ORAL NIGHTLY
Status: DISPENSED | OUTPATIENT
Start: 2025-04-03

## 2025-04-03 RX ORDER — IPRATROPIUM BROMIDE AND ALBUTEROL SULFATE 2.5; .5 MG/3ML; MG/3ML
3 SOLUTION RESPIRATORY (INHALATION) EVERY 2 HOUR PRN
Status: ACTIVE | OUTPATIENT
Start: 2025-04-03

## 2025-04-03 RX ORDER — LISINOPRIL 40 MG/1
40 TABLET ORAL DAILY
Status: DISPENSED | OUTPATIENT
Start: 2025-04-04

## 2025-04-03 RX ADMIN — TRAZODONE HYDROCHLORIDE 100 MG: 50 TABLET ORAL at 21:57

## 2025-04-03 RX ADMIN — Medication 4 L/MIN: at 21:59

## 2025-04-03 RX ADMIN — HYDROXYZINE HYDROCHLORIDE 25 MG: 25 TABLET ORAL at 21:46

## 2025-04-03 RX ADMIN — ALBUTEROL SULFATE 2.5 MG: 2.5 SOLUTION RESPIRATORY (INHALATION) at 17:38

## 2025-04-03 RX ADMIN — METHYLPREDNISOLONE SODIUM SUCCINATE 125 MG: 125 INJECTION, POWDER, FOR SOLUTION INTRAMUSCULAR; INTRAVENOUS at 18:11

## 2025-04-03 RX ADMIN — IPRATROPIUM BROMIDE AND ALBUTEROL SULFATE 3 ML: .5; 3 SOLUTION RESPIRATORY (INHALATION) at 17:38

## 2025-04-03 RX ADMIN — ONDANSETRON 4 MG: 2 INJECTION INTRAMUSCULAR; INTRAVENOUS at 23:14

## 2025-04-03 RX ADMIN — GABAPENTIN 300 MG: 300 CAPSULE ORAL at 21:58

## 2025-04-03 RX ADMIN — AZITHROMYCIN MONOHYDRATE 500 MG: 500 INJECTION, POWDER, LYOPHILIZED, FOR SOLUTION INTRAVENOUS at 23:07

## 2025-04-03 RX ADMIN — ENOXAPARIN SODIUM 40 MG: 40 INJECTION SUBCUTANEOUS at 21:47

## 2025-04-03 SDOH — SOCIAL STABILITY: SOCIAL INSECURITY: WITHIN THE LAST YEAR, HAVE YOU BEEN HUMILIATED OR EMOTIONALLY ABUSED IN OTHER WAYS BY YOUR PARTNER OR EX-PARTNER?: NO

## 2025-04-03 SDOH — HEALTH STABILITY: MENTAL HEALTH: HOW OFTEN DO YOU HAVE SIX OR MORE DRINKS ON ONE OCCASION?: NEVER

## 2025-04-03 SDOH — HEALTH STABILITY: MENTAL HEALTH: HOW OFTEN DO YOU HAVE A DRINK CONTAINING ALCOHOL?: NEVER

## 2025-04-03 SDOH — ECONOMIC STABILITY: HOUSING INSECURITY: IN THE LAST 12 MONTHS, WAS THERE A TIME WHEN YOU WERE NOT ABLE TO PAY THE MORTGAGE OR RENT ON TIME?: NO

## 2025-04-03 SDOH — SOCIAL STABILITY: SOCIAL INSECURITY: WITHIN THE LAST YEAR, HAVE YOU BEEN AFRAID OF YOUR PARTNER OR EX-PARTNER?: NO

## 2025-04-03 SDOH — SOCIAL STABILITY: SOCIAL INSECURITY: HAVE YOU HAD THOUGHTS OF HARMING ANYONE ELSE?: NO

## 2025-04-03 SDOH — ECONOMIC STABILITY: FOOD INSECURITY: WITHIN THE PAST 12 MONTHS, THE FOOD YOU BOUGHT JUST DIDN'T LAST AND YOU DIDN'T HAVE MONEY TO GET MORE.: NEVER TRUE

## 2025-04-03 SDOH — ECONOMIC STABILITY: HOUSING INSECURITY: IN THE PAST 12 MONTHS, HOW MANY TIMES HAVE YOU MOVED WHERE YOU WERE LIVING?: 0

## 2025-04-03 SDOH — SOCIAL STABILITY: SOCIAL INSECURITY: DOES ANYONE TRY TO KEEP YOU FROM HAVING/CONTACTING OTHER FRIENDS OR DOING THINGS OUTSIDE YOUR HOME?: NO

## 2025-04-03 SDOH — ECONOMIC STABILITY: HOUSING INSECURITY: AT ANY TIME IN THE PAST 12 MONTHS, WERE YOU HOMELESS OR LIVING IN A SHELTER (INCLUDING NOW)?: NO

## 2025-04-03 SDOH — SOCIAL STABILITY: SOCIAL INSECURITY: DO YOU FEEL UNSAFE GOING BACK TO THE PLACE WHERE YOU ARE LIVING?: NO

## 2025-04-03 SDOH — ECONOMIC STABILITY: FOOD INSECURITY: WITHIN THE PAST 12 MONTHS, YOU WORRIED THAT YOUR FOOD WOULD RUN OUT BEFORE YOU GOT THE MONEY TO BUY MORE.: NEVER TRUE

## 2025-04-03 SDOH — HEALTH STABILITY: MENTAL HEALTH: HOW MANY DRINKS CONTAINING ALCOHOL DO YOU HAVE ON A TYPICAL DAY WHEN YOU ARE DRINKING?: PATIENT DOES NOT DRINK

## 2025-04-03 SDOH — SOCIAL STABILITY: SOCIAL INSECURITY: HAS ANYONE EVER THREATENED TO HURT YOUR FAMILY OR YOUR PETS?: NO

## 2025-04-03 SDOH — ECONOMIC STABILITY: FOOD INSECURITY: HOW HARD IS IT FOR YOU TO PAY FOR THE VERY BASICS LIKE FOOD, HOUSING, MEDICAL CARE, AND HEATING?: NOT HARD AT ALL

## 2025-04-03 SDOH — SOCIAL STABILITY: SOCIAL INSECURITY: ARE YOU OR HAVE YOU BEEN THREATENED OR ABUSED PHYSICALLY, EMOTIONALLY, OR SEXUALLY BY ANYONE?: NO

## 2025-04-03 SDOH — ECONOMIC STABILITY: INCOME INSECURITY: IN THE PAST 12 MONTHS HAS THE ELECTRIC, GAS, OIL, OR WATER COMPANY THREATENED TO SHUT OFF SERVICES IN YOUR HOME?: NO

## 2025-04-03 SDOH — SOCIAL STABILITY: SOCIAL INSECURITY: ARE THERE ANY APPARENT SIGNS OF INJURIES/BEHAVIORS THAT COULD BE RELATED TO ABUSE/NEGLECT?: NO

## 2025-04-03 SDOH — SOCIAL STABILITY: SOCIAL INSECURITY: WERE YOU ABLE TO COMPLETE ALL THE BEHAVIORAL HEALTH SCREENINGS?: YES

## 2025-04-03 SDOH — ECONOMIC STABILITY: TRANSPORTATION INSECURITY: IN THE PAST 12 MONTHS, HAS LACK OF TRANSPORTATION KEPT YOU FROM MEDICAL APPOINTMENTS OR FROM GETTING MEDICATIONS?: NO

## 2025-04-03 SDOH — SOCIAL STABILITY: SOCIAL INSECURITY: ABUSE: ADULT

## 2025-04-03 SDOH — SOCIAL STABILITY: SOCIAL INSECURITY: HAVE YOU HAD ANY THOUGHTS OF HARMING ANYONE ELSE?: NO

## 2025-04-03 SDOH — SOCIAL STABILITY: SOCIAL INSECURITY: DO YOU FEEL ANYONE HAS EXPLOITED OR TAKEN ADVANTAGE OF YOU FINANCIALLY OR OF YOUR PERSONAL PROPERTY?: NO

## 2025-04-03 ASSESSMENT — ACTIVITIES OF DAILY LIVING (ADL)
DRESSING YOURSELF: NEEDS ASSISTANCE
GROOMING: INDEPENDENT
PATIENT'S MEMORY ADEQUATE TO SAFELY COMPLETE DAILY ACTIVITIES?: YES
WALKS IN HOME: INDEPENDENT
ADEQUATE_TO_COMPLETE_ADL: YES
BATHING: NEEDS ASSISTANCE
HEARING - RIGHT EAR: FUNCTIONAL
LACK_OF_TRANSPORTATION: NO
JUDGMENT_ADEQUATE_SAFELY_COMPLETE_DAILY_ACTIVITIES: YES
TOILETING: NEEDS ASSISTANCE
HEARING - LEFT EAR: FUNCTIONAL
FEEDING YOURSELF: INDEPENDENT

## 2025-04-03 ASSESSMENT — COGNITIVE AND FUNCTIONAL STATUS - GENERAL
PERSONAL GROOMING: A LITTLE
MOVING TO AND FROM BED TO CHAIR: A LITTLE
TOILETING: A LITTLE
HELP NEEDED FOR BATHING: A LITTLE
DRESSING REGULAR LOWER BODY CLOTHING: A LITTLE
DAILY ACTIVITIY SCORE: 19
STANDING UP FROM CHAIR USING ARMS: A LITTLE
WALKING IN HOSPITAL ROOM: A LITTLE
MOBILITY SCORE: 21
PATIENT BASELINE BEDBOUND: NO
DRESSING REGULAR UPPER BODY CLOTHING: A LITTLE

## 2025-04-03 ASSESSMENT — COLUMBIA-SUICIDE SEVERITY RATING SCALE - C-SSRS
1. IN THE PAST MONTH, HAVE YOU WISHED YOU WERE DEAD OR WISHED YOU COULD GO TO SLEEP AND NOT WAKE UP?: NO
6. HAVE YOU EVER DONE ANYTHING, STARTED TO DO ANYTHING, OR PREPARED TO DO ANYTHING TO END YOUR LIFE?: NO
2. HAVE YOU ACTUALLY HAD ANY THOUGHTS OF KILLING YOURSELF?: NO

## 2025-04-03 ASSESSMENT — LIFESTYLE VARIABLES
HOW OFTEN DO YOU HAVE A DRINK CONTAINING ALCOHOL: NEVER
SKIP TO QUESTIONS 9-10: 1
AUDIT-C TOTAL SCORE: 0
HOW MANY STANDARD DRINKS CONTAINING ALCOHOL DO YOU HAVE ON A TYPICAL DAY: PATIENT DOES NOT DRINK
AUDIT-C TOTAL SCORE: 0
HOW OFTEN DO YOU HAVE 6 OR MORE DRINKS ON ONE OCCASION: NEVER
SKIP TO QUESTIONS 9-10: 1
AUDIT-C TOTAL SCORE: 0

## 2025-04-03 ASSESSMENT — PATIENT HEALTH QUESTIONNAIRE - PHQ9
1. LITTLE INTEREST OR PLEASURE IN DOING THINGS: NOT AT ALL
2. FEELING DOWN, DEPRESSED OR HOPELESS: NOT AT ALL
SUM OF ALL RESPONSES TO PHQ9 QUESTIONS 1 & 2: 0

## 2025-04-03 ASSESSMENT — PAIN SCALES - GENERAL
PAINLEVEL_OUTOF10: 0 - NO PAIN
PAINLEVEL_OUTOF10: 0 - NO PAIN

## 2025-04-03 ASSESSMENT — PAIN - FUNCTIONAL ASSESSMENT
PAIN_FUNCTIONAL_ASSESSMENT: 0-10
PAIN_FUNCTIONAL_ASSESSMENT: 0-10

## 2025-04-03 NOTE — ED PROVIDER NOTES
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  Patient had symptoms been ongoing over the past 4 days and is worse with activity and improves at rest.  Patient complains of a dry nonproductive cough and increased fatigue.  Patient denies any contact with known ill individuals or recent travel.  Patient states she normally wears 2 L of home oxygen however had increased to 5 L recently.  Patient denies any chest pain, abdominal pain nausea vomiting or diarrhea or constipation, fever, shaking, or chills.              Patient History   Past Medical History:   Diagnosis Date    Lung nodule     Vertigo 2009     Past Surgical History:   Procedure Laterality Date     SECTION, CLASSIC       Family History   Problem Relation Name Age of Onset    Brain cancer Sister      Lung cancer Sister      Lung cancer Niece       Social History     Tobacco Use    Smoking status: Former     Current packs/day: 1.00     Average packs/day: 1 pack/day for 45.3 years (45.3 ttl pk-yrs)     Types: Cigarettes     Start date:     Smokeless tobacco: Never   Substance Use Topics    Alcohol use: Not Currently    Drug use: Not Currently       Physical Exam   ED Triage Vitals [25 1712]   Temperature Heart Rate Respirations BP   36 °C (96.8 °F) 70 (!) 24 164/81      Pulse Ox Temp src Heart Rate Source Patient Position   96 % -- -- --      BP Location FiO2 (%)     -- --       Physical Exam  Vitals and nursing note reviewed. Exam conducted with a chaperone present.   Constitutional:       General: She is not in acute  distress.     Appearance: Normal appearance. She is not ill-appearing, toxic-appearing or diaphoretic.      Interventions: She is not intubated.  HENT:      Head: Normocephalic.      Nose: Nose normal.      Mouth/Throat:      Mouth: Mucous membranes are moist.      Pharynx: No oropharyngeal exudate or posterior oropharyngeal erythema.   Eyes:      General:         Right eye: No discharge.         Left eye: No discharge.      Extraocular Movements: Extraocular movements intact.      Pupils: Pupils are equal, round, and reactive to light.   Neck:      Vascular: No carotid bruit.   Cardiovascular:      Rate and Rhythm: Normal rate and regular rhythm.      Pulses: Normal pulses. No decreased pulses.      Heart sounds: Normal heart sounds. Heart sounds not distant. No murmur heard.     No friction rub. No gallop.   Pulmonary:      Effort: Pulmonary effort is normal. No tachypnea, bradypnea, accessory muscle usage, prolonged expiration, respiratory distress or retractions. She is not intubated.      Breath sounds: No stridor, decreased air movement or transmitted upper airway sounds. Examination of the right-upper field reveals wheezing. Examination of the left-upper field reveals wheezing. Examination of the right-middle field reveals wheezing. Examination of the left-middle field reveals wheezing. Examination of the right-lower field reveals decreased breath sounds. Examination of the left-lower field reveals decreased breath sounds. Decreased breath sounds and wheezing present. No rhonchi or rales.   Chest:      Chest wall: No tenderness.   Abdominal:      General: Abdomen is flat. Bowel sounds are normal.      Palpations: Abdomen is soft.   Musculoskeletal:         General: Normal range of motion.      Cervical back: Normal range of motion and neck supple. No rigidity or tenderness.   Lymphadenopathy:      Cervical: No cervical adenopathy.   Skin:     General: Skin is warm and dry.      Capillary Refill: Capillary  refill takes less than 2 seconds.   Neurological:      General: No focal deficit present.      Mental Status: She is alert and oriented to person, place, and time.           ED Course & MDM   ED Course as of 04/03/25 1941   Thu Apr 03, 2025 1821 Chest x-ray reveals  IMPRESSION:  Hyperinflated lungs. No evidence of acute cardiopulmonary process.   [EC]   1821 EKG interpreted by myself reveals sinus rhythm with PVC and right bundle branch block at a rate of 68 bpm with no ST elevation, there is T wave inversion however in lead III that is similar in comparison to previous EKG, DC interval of 176,  and QTc of 467. [EC]      ED Course User Index  [EC] Giorgi Crum, APRN-CNP         Diagnoses as of 04/03/25 1941   Shortness of breath   COPD exacerbation (Multi)   Hypoxia                 No data recorded     Mikey Coma Scale Score: 15 (04/03/25 1913 : Allegra Avery RN)                           Medical Decision Making  Given patient's complaint presentation a thorough exam was performed.  Initially on exam patient is on 4 L nasal cannula, diffuse wheezing auscultated at the upper lung fields with diminished lung sounds auscultated in the bilateral bases, cardiac sounds auscultated are regular, denies any current chest pain, lightheadedness, dizziness and remains neurologically intact with no focal deficits, remains hemodynamically stable during emergency evaluation, is afebrile.  Lab work was ordered including EKG, chest x-ray, DuoNeb breathing treatment, albuterol and Solu-Medrol. Lab work reveals several irregularities without any critical lab values, chest x-ray as interpreted by radiologist reveals hyperinflated lungs with no evidence of acute cardiopulmonary process.  Reevaluation of patient reveals worsening symptoms.  Patient states she went to the bathroom in terms of off the oxygen.  Patient states upon arriving back in the room shortness of breath became much worse.  Patient was placed on pulse ox  and was found to be 86%, O2 was increased to 6 L and patient improved.  Discussion was had with patient regards to admission for COPD exacerbation and hypoxia.  Patient was agreeable with this plan.  I consulted Dr. Saldivar regards to admission.  Dr. Saldivar has agreed to admit patient to his service and will manage inpatient care.    ЕЛЕНА Bejarano     Portions of this note were generated using digital voice recognition software, and may contain grammatical errors      Procedure  Procedures       ЕЛЕНА Bejarano  04/03/25 1941

## 2025-04-03 NOTE — ED TRIAGE NOTES
Patient to ER with complaint of shortness of breath since Sunday. Usually on 2L on O2 but turned it up to 4L today. Supposed to follow up with pulm for a nodule on her lung

## 2025-04-04 ENCOUNTER — APPOINTMENT (OUTPATIENT)
Dept: CARDIOLOGY | Facility: HOSPITAL | Age: 80
End: 2025-04-04
Payer: COMMERCIAL

## 2025-04-04 LAB
ANION GAP SERPL CALC-SCNC: 13 MMOL/L (ref 10–20)
AORTIC VALVE MEAN GRADIENT: 4 MMHG
AORTIC VALVE PEAK VELOCITY: 1.32 M/S
AV PEAK GRADIENT: 7 MMHG
AVA (PEAK VEL): 2.17 CM2
AVA (VTI): 1.73 CM2
BUN SERPL-MCNC: 24 MG/DL (ref 6–23)
CALCIUM SERPL-MCNC: 9.3 MG/DL (ref 8.6–10.3)
CHLORIDE SERPL-SCNC: 99 MMOL/L (ref 98–107)
CO2 SERPL-SCNC: 28 MMOL/L (ref 21–32)
CREAT SERPL-MCNC: 0.84 MG/DL (ref 0.5–1.05)
EGFRCR SERPLBLD CKD-EPI 2021: 72 ML/MIN/1.73M*2
EJECTION FRACTION APICAL 4 CHAMBER: 63.6
EJECTION FRACTION: 60 %
ERYTHROCYTE [DISTWIDTH] IN BLOOD BY AUTOMATED COUNT: 14.2 % (ref 11.5–14.5)
GLUCOSE SERPL-MCNC: 147 MG/DL (ref 74–99)
HCT VFR BLD AUTO: 43.1 % (ref 36–46)
HGB BLD-MCNC: 13.4 G/DL (ref 12–16)
LEFT ATRIUM VOLUME AREA LENGTH INDEX BSA: 10.7 ML/M2
LEFT VENTRICLE INTERNAL DIMENSION DIASTOLE: 3.7 CM (ref 3.5–6)
LEFT VENTRICULAR OUTFLOW TRACT DIAMETER: 1.9 CM
MCH RBC QN AUTO: 29.8 PG (ref 26–34)
MCHC RBC AUTO-ENTMCNC: 31.1 G/DL (ref 32–36)
MCV RBC AUTO: 96 FL (ref 80–100)
MITRAL VALVE E/A RATIO: 0.7
NRBC BLD-RTO: 0 /100 WBCS (ref 0–0)
PLATELET # BLD AUTO: 325 X10*3/UL (ref 150–450)
POTASSIUM SERPL-SCNC: 4.5 MMOL/L (ref 3.5–5.3)
RBC # BLD AUTO: 4.5 X10*6/UL (ref 4–5.2)
RIGHT VENTRICLE FREE WALL PEAK S': 17.2 CM/S
SODIUM SERPL-SCNC: 135 MMOL/L (ref 136–145)
TRICUSPID ANNULAR PLANE SYSTOLIC EXCURSION: 2.1 CM
WBC # BLD AUTO: 8 X10*3/UL (ref 4.4–11.3)

## 2025-04-04 PROCEDURE — 2500000004 HC RX 250 GENERAL PHARMACY W/ HCPCS (ALT 636 FOR OP/ED): Performed by: INTERNAL MEDICINE

## 2025-04-04 PROCEDURE — 94640 AIRWAY INHALATION TREATMENT: CPT

## 2025-04-04 PROCEDURE — 96372 THER/PROPH/DIAG INJ SC/IM: CPT | Performed by: PHYSICIAN ASSISTANT

## 2025-04-04 PROCEDURE — 93306 TTE W/DOPPLER COMPLETE: CPT | Performed by: STUDENT IN AN ORGANIZED HEALTH CARE EDUCATION/TRAINING PROGRAM

## 2025-04-04 PROCEDURE — 80048 BASIC METABOLIC PNL TOTAL CA: CPT | Performed by: PHYSICIAN ASSISTANT

## 2025-04-04 PROCEDURE — 97161 PT EVAL LOW COMPLEX 20 MIN: CPT | Mod: GP

## 2025-04-04 PROCEDURE — 85027 COMPLETE CBC AUTOMATED: CPT | Performed by: PHYSICIAN ASSISTANT

## 2025-04-04 PROCEDURE — 2500000002 HC RX 250 W HCPCS SELF ADMINISTERED DRUGS (ALT 637 FOR MEDICARE OP, ALT 636 FOR OP/ED): Performed by: INTERNAL MEDICINE

## 2025-04-04 PROCEDURE — G0378 HOSPITAL OBSERVATION PER HR: HCPCS

## 2025-04-04 PROCEDURE — 93306 TTE W/DOPPLER COMPLETE: CPT

## 2025-04-04 PROCEDURE — 2500000001 HC RX 250 WO HCPCS SELF ADMINISTERED DRUGS (ALT 637 FOR MEDICARE OP): Performed by: PHYSICIAN ASSISTANT

## 2025-04-04 PROCEDURE — 97165 OT EVAL LOW COMPLEX 30 MIN: CPT | Mod: GO

## 2025-04-04 PROCEDURE — 2500000005 HC RX 250 GENERAL PHARMACY W/O HCPCS: Performed by: PHYSICIAN ASSISTANT

## 2025-04-04 PROCEDURE — 82374 ASSAY BLOOD CARBON DIOXIDE: CPT | Performed by: PHYSICIAN ASSISTANT

## 2025-04-04 PROCEDURE — 2500000004 HC RX 250 GENERAL PHARMACY W/ HCPCS (ALT 636 FOR OP/ED): Performed by: PHYSICIAN ASSISTANT

## 2025-04-04 PROCEDURE — 36415 COLL VENOUS BLD VENIPUNCTURE: CPT | Performed by: PHYSICIAN ASSISTANT

## 2025-04-04 RX ORDER — IPRATROPIUM BROMIDE AND ALBUTEROL SULFATE 2.5; .5 MG/3ML; MG/3ML
3 SOLUTION RESPIRATORY (INHALATION)
Status: DISCONTINUED | OUTPATIENT
Start: 2025-04-04 | End: 2025-04-06

## 2025-04-04 RX ORDER — GUAIFENESIN 600 MG/1
1200 TABLET, EXTENDED RELEASE ORAL 2 TIMES DAILY
Status: DISPENSED | OUTPATIENT
Start: 2025-04-04

## 2025-04-04 RX ADMIN — ASPIRIN 81 MG: 81 TABLET, COATED ORAL at 09:31

## 2025-04-04 RX ADMIN — ONDANSETRON HYDROCHLORIDE 4 MG: 4 TABLET, FILM COATED ORAL at 21:12

## 2025-04-04 RX ADMIN — GUAIFENESIN 1200 MG: 600 TABLET, EXTENDED RELEASE ORAL at 21:10

## 2025-04-04 RX ADMIN — TRAZODONE HYDROCHLORIDE 100 MG: 50 TABLET ORAL at 21:10

## 2025-04-04 RX ADMIN — Medication 36 PERCENT: at 19:18

## 2025-04-04 RX ADMIN — AZITHROMYCIN MONOHYDRATE 500 MG: 500 INJECTION, POWDER, LYOPHILIZED, FOR SOLUTION INTRAVENOUS at 21:10

## 2025-04-04 RX ADMIN — LISINOPRIL 40 MG: 40 TABLET ORAL at 09:31

## 2025-04-04 RX ADMIN — TRAMADOL HYDROCHLORIDE 50 MG: 50 TABLET, COATED ORAL at 21:16

## 2025-04-04 RX ADMIN — ESCITALOPRAM OXALATE 10 MG: 10 TABLET ORAL at 09:31

## 2025-04-04 RX ADMIN — ENOXAPARIN SODIUM 40 MG: 40 INJECTION SUBCUTANEOUS at 21:10

## 2025-04-04 RX ADMIN — GUAIFENESIN 1200 MG: 600 TABLET, EXTENDED RELEASE ORAL at 13:05

## 2025-04-04 RX ADMIN — METOPROLOL SUCCINATE 50 MG: 50 TABLET, EXTENDED RELEASE ORAL at 09:30

## 2025-04-04 RX ADMIN — GABAPENTIN 300 MG: 300 CAPSULE ORAL at 21:10

## 2025-04-04 RX ADMIN — HYDROXYZINE HYDROCHLORIDE 25 MG: 25 TABLET ORAL at 18:50

## 2025-04-04 RX ADMIN — GABAPENTIN 300 MG: 300 CAPSULE ORAL at 09:31

## 2025-04-04 RX ADMIN — ATORVASTATIN CALCIUM 80 MG: 80 TABLET, FILM COATED ORAL at 09:31

## 2025-04-04 RX ADMIN — METHYLPREDNISOLONE SODIUM SUCCINATE 40 MG: 40 INJECTION, POWDER, FOR SOLUTION INTRAMUSCULAR; INTRAVENOUS at 21:10

## 2025-04-04 RX ADMIN — METHYLPREDNISOLONE SODIUM SUCCINATE 40 MG: 40 INJECTION, POWDER, FOR SOLUTION INTRAMUSCULAR; INTRAVENOUS at 13:08

## 2025-04-04 RX ADMIN — IPRATROPIUM BROMIDE AND ALBUTEROL SULFATE 3 ML: .5; 3 SOLUTION RESPIRATORY (INHALATION) at 13:09

## 2025-04-04 RX ADMIN — METHYLPREDNISOLONE SODIUM SUCCINATE 40 MG: 40 INJECTION, POWDER, FOR SOLUTION INTRAMUSCULAR; INTRAVENOUS at 02:39

## 2025-04-04 RX ADMIN — IPRATROPIUM BROMIDE AND ALBUTEROL SULFATE 3 ML: .5; 3 SOLUTION RESPIRATORY (INHALATION) at 19:14

## 2025-04-04 RX ADMIN — AMLODIPINE BESYLATE 5 MG: 5 TABLET ORAL at 09:31

## 2025-04-04 ASSESSMENT — PAIN SCALES - WONG BAKER: WONGBAKER_NUMERICALRESPONSE: NO HURT

## 2025-04-04 ASSESSMENT — PAIN - FUNCTIONAL ASSESSMENT
PAIN_FUNCTIONAL_ASSESSMENT: 0-10

## 2025-04-04 ASSESSMENT — COGNITIVE AND FUNCTIONAL STATUS - GENERAL
DRESSING REGULAR LOWER BODY CLOTHING: A LOT
CLIMB 3 TO 5 STEPS WITH RAILING: A LITTLE
MOBILITY SCORE: 20
WALKING IN HOSPITAL ROOM: A LITTLE
DAILY ACTIVITIY SCORE: 17
STANDING UP FROM CHAIR USING ARMS: A LITTLE
TOILETING: A LITTLE
PERSONAL GROOMING: A LITTLE
HELP NEEDED FOR BATHING: A LOT
MOVING TO AND FROM BED TO CHAIR: A LITTLE
DRESSING REGULAR UPPER BODY CLOTHING: A LITTLE

## 2025-04-04 ASSESSMENT — PAIN SCALES - GENERAL
PAINLEVEL_OUTOF10: 0 - NO PAIN
PAINLEVEL_OUTOF10: 7
PAINLEVEL_OUTOF10: 3
PAINLEVEL_OUTOF10: 0 - NO PAIN

## 2025-04-04 NOTE — PROGRESS NOTES
Physical Therapy    Physical Therapy Evaluation    Patient Name: Radha Logan  MRN: [id number]  Today's Date: 4/4/2025   Time Calculation  Start Time: 1026  Stop Time: 1044  Time Calculation (min): 18 min  322/322-A    Assessment/Plan   PT Assessment  PT Assessment Results: Decreased endurance, Decreased strength, Impaired balance, Decreased mobility  Rehab Prognosis: Good  Barriers to Discharge Home: No anticipated barriers  Evaluation/Treatment Tolerance: Patient limited by fatigue  Strengths: Ability to acquire knowledge, Capable of completing ADLs semi/independent, Insight into problems  End of Session Communication: Bedside nurse  Assessment Comment: Pt admitted 4/3 with COPD exacerbation and demos impaired endurance, balance and safety. Pt would benefit from continued PT while admitted for endurance training and low intensity therapy once discharged.  End of Session Patient Position: Up in chair, Alarm off, not on at start of session  IP OR SWING BED PT PLAN  Inpatient or Swing Bed: Inpatient  PT Plan  Treatment/Interventions: Transfer training, Gait training, Balance training, Endurance training  PT Plan: Ongoing PT  PT Frequency: 3 times per week  PT Discharge Recommendations: Low intensity level of continued care  Equipment Recommended upon Discharge: Wheeled walker  PT - OK to Discharge: Yes    Subjective     Current Problem:  1. Shortness of breath  Transthoracic echo (TTE) complete    Transthoracic echo (TTE) complete      2. COPD exacerbation (Multi)        3. 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    Dizziness    Dry eyes    Epigastric abdominal pain    VLAD (generalized anxiety disorder)    History of colonic polyps    History of non-ST elevation myocardial infarction (NSTEMI)    Hyperlipidemia    Hypotension    Leucocytosis    Lumbar radiculopathy    Pain in both lower extremities    Pneumonia    Prediabetes    Pseudophakia, left eye    Pseudophakia, right eye    RBBB    Retina disorder, left    Retinal hemorrhage of right eye    SNHL (sensorineural hearing loss)    Spinal stenosis of lumbar region    Median arcuate ligament syndrome (CMS-HCC)    Stenosis of celiac artery    Subclinical hyperthyroidism    Tobacco use disorder    Vitreous detachment    Vitreous detachment of both eyes    Shortness of breath    COPD exacerbation (Multi)    Wheezing    Hypoxia    Tachypnea     General Visit Information:  General  Reason for Referral: PT eval and treat; impaired mobility  Referred By: Cole Eagle PA-C  Past Medical History Relevant to Rehab: Pt admitted 4/3 with SOB and increased O2 needs from 2L to 4L at home. Found to have COPD exacerbation. 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 No device at home. Drives and is ind with ADLs. Ind with IADLs as well.    Prior Level of Function:  Prior Function Per Pt/Caregiver Report  Level of [place]: Independent with ADLs and functional transfers, Independent with homemaking with ambulation  Prior Function Comments: Drives and  shops    Precautions:  Precautions  Medical Precautions: Fall precautions  Precautions Comment: 4-5L O2 NC     Objective     Pain:  Pain Assessment  Pain Assessment: 0-10  0-10 (Numeric) Pain Score: 0 - No pain    Cognition:  Cognition  Overall Cognitive Status: Within Functional Limits    General Assessments:      Activity Tolerance  Endurance: Decreased tolerance for upright activites  Sensation  Light Touch: No apparent deficits  Strength  Strength Comments: BLE WFL    Functional Assessments:     Bed Mobility  Bed Mobility:  (completed supine to sit with supervision. Spo2 in sitting 96% on 5L NC)  Transfers  Transfer:  (from EOB to no device with CGA and demos slight unsteadiness. Denies dizziness.)  Ambulation/Gait Training  Ambulation/Gait Training Performed:  (completed ~40'x1 with no device and requires Alyse x1 for balance. Pt with slight lightheadedness and spo2 92%. Pt then completed ~75'x1 with use of FWW and progressed to CGA. Spo2 88% and increased to 94% with pursed lip breathing.)        Extremity/Trunk Assessments:     RLE   RLE : Within Functional Limits  LLE   LLE : Within Functional Limits    Outcome Measures:     [facility] Basic Mobility  Turning from your back to your side while in a flat bed without using bedrails: None  Moving from lying on your back to sitting on the side of a flat bed without using bedrails: None  Moving to and from bed to chair (including a wheelchair): A little  Standing up from a chair using your arms (e.g. wheelchair or bedside chair): A little  To walk in hospital room: A little  Climbing 3-5 steps with railing: A little  Basic Mobility - Total Score: 20     Goals:  Encounter Problems       Encounter Problems (Active)       PT Problem       PT Goal 1 STG - Pt will transition supine <> sitting with MOD I  (Progressing)       Start:  04/04/25    Expected End:  04/18/25            PT Goal 2 STG - Pt will transfer STS with MOD I  (Progressing)       Start:  04/04/25    Expected  End:  04/18/25            PT Goal 3 STG - Pt will amb 100' using LRD with supervision  (Progressing)       Start:  04/04/25    Expected End:  04/18/25            PT Goal 4 STG - Pt will perform a B LE ther ex program of 2-3 sets of 10  (Progressing)       Start:  04/04/25    Expected End:  04/18/25               Pain - Adult            Education Documentation  Precautions, taught by Filomena Martinez PT at 4/4/2025 12:23 PM.  Learner: Patient  Readiness: Acceptance  Method: Explanation  Response: Verbalizes Understanding, Needs Reinforcement    Mobility Training, taught by Filomena Martinez PT at 4/4/2025 12:23 PM.  Learner: Patient  Readiness: Acceptance  Method: Explanation  Response: Verbalizes Understanding, Needs Reinforcement    Education Comments  No comments found.

## 2025-04-04 NOTE — H&P
History Of Present Illness  Radha Logan is a 79 y.o. 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  States her symptoms been ongoing for the past 4 days.  Reports shortness of breath worse with activity/movement and improves at rest.  Denies any recent travel, denies any sick contacts.  States she normally wears 3 L of home oxygen at baseline, however has increased it to 5 L over the past couple days.  Says she has noticed herself wheezing quite a bit at home.  Also reports a nonproductive cough over the past couple days.  Per EMR review, patient with recent admission at the beginning of March for similar symptoms, advised to follow-up with pulmonology outpatient and also cardiothoracic surgery for stable aortic aneurysm.  Patient sent home with breathing treatments and also oxygen, had only started using oxygen after that admission.  Patient is following up with her pulmonology appointment on Wednesday of next week she states.  States that shortness of breath is slightly improved in the ED after breathing treatments.  Denies headaches, dizziness, chest pains, abdominal pain or nausea, appetite changes, urinary/bowel changes, or fever/chills.  Patient does states she quit smoking approximately a month ago, around the time she came in to the hospital for her recent admission.    ED course: On arrival to the ED, patient afebrile, tachypneic with respirations 24, hypertensive with blood pressure 164/81, pulse 70, and SpO2 96% on 3 L oxygen via nasal cannula.  Glucose 129, electrolytes WNL, renal function WNL, LFTs WNL.  Magnesium 1.85, troponin 5.  .  WBC 13.2, hemoglobin 14.8/hematocrit 47.4, platelets 308.  COVID, flu, RSV negative.  VBG shows pH 7.38,  pCO2 50, pO2 56, HCO3 29.6.  Chest x-ray shows hyperinflated lungs, no evidence of acute cardiopulmonary process.  No consolidation.  Patient given 125 mg IV Solu-Medrol, DuoNeb, and albuterol in the ED.    EKG at 1821 (interpreted by ED physician): Sinus rhythm, with PVC and RBBB, rate of 68 bpm, no ST elevation, there is T wave inversion however in lead III that is similar in comparison to previous EKG, MS interval 176, , QTc 467.    Admitting providers Dr. Saldivar     Past Medical History  Past Medical History:   Diagnosis Date    Lung nodule     Vertigo 2009     Surgical History  Past Surgical History:   Procedure Laterality Date     SECTION, CLASSIC       Social History  She reports that she has quit smoking. Her smoking use included cigarettes. She started smoking about 45 years ago. She has a 45.3 pack-year smoking history. She has never used smokeless tobacco. She reports that she does not currently use alcohol. She reports that she does not currently use drugs.    Family History  Family History   Problem Relation Name Age of Onset    Brain cancer Sister      Lung cancer Sister      Lung cancer Niece       Allergies  Other, Tetracycline, Azithromycin, Codeine, Doxycycline, Levaquin [levofloxacin], and Penicillins    Review of Systems  10 point review of system negative except as noted above in HPI    Physical Exam  Constitutional:       General: She is not in acute distress.     Appearance: Normal appearance. She is not toxic-appearing.   HENT:      Head: Normocephalic and atraumatic.      Mouth/Throat:      Mouth: Mucous membranes are moist.      Pharynx: Oropharynx is clear.   Eyes:      Conjunctiva/sclera: Conjunctivae normal.   Cardiovascular:      Rate and Rhythm: Normal rate and regular rhythm.      Pulses: Normal pulses.      Heart sounds: Normal heart sounds.   Pulmonary:      Breath sounds: Wheezing (Bilateral throughout all lung fields.) present.      Comments: Tachypnea.   Hypoxic, currently 92% on 5 L oxygen via nasal cannula.  Mild conversational dyspnea.  Abdominal:      General: Bowel sounds are normal. There is no distension.      Palpations: Abdomen is soft.      Tenderness: There is no abdominal tenderness.   Musculoskeletal:         General: No tenderness. Normal range of motion.      Right lower leg: No edema.      Left lower leg: No edema.   Skin:     General: Skin is warm and dry.   Neurological:      General: No focal deficit present.      Mental Status: She is alert and oriented to person, place, and time.   Psychiatric:         Mood and Affect: Mood normal.         Behavior: Behavior normal.       Last Recorded Vitals  Blood pressure 157/75, pulse 78, temperature 36 °C (96.8 °F), resp. rate (!) 22, SpO2 (!) 93%.    Relevant Results  Results for orders placed or performed during the hospital encounter of 04/03/25 (from the past 24 hours)   CBC and Auto Differential   Result Value Ref Range    WBC 13.2 (H) 4.4 - 11.3 x10*3/uL    nRBC 0.0 0.0 - 0.0 /100 WBCs    RBC 4.90 4.00 - 5.20 x10*6/uL    Hemoglobin 14.8 12.0 - 16.0 g/dL    Hematocrit 47.4 (H) 36.0 - 46.0 %    MCV 97 80 - 100 fL    MCH 30.2 26.0 - 34.0 pg    MCHC 31.2 (L) 32.0 - 36.0 g/dL    RDW 14.4 11.5 - 14.5 %    Platelets 308 150 - 450 x10*3/uL    Neutrophils % 76.0 40.0 - 80.0 %    Immature Granulocytes %, Automated 0.4 0.0 - 0.9 %    Lymphocytes % 11.0 13.0 - 44.0 %    Monocytes % 8.5 2.0 - 10.0 %    Eosinophils % 3.0 0.0 - 6.0 %    Basophils % 1.1 0.0 - 2.0 %    Neutrophils Absolute 10.07 (H) 1.60 - 5.50 x10*3/uL    Immature Granulocytes Absolute, Automated 0.05 0.00 - 0.50 x10*3/uL    Lymphocytes Absolute 1.45 0.80 - 3.00 x10*3/uL    Monocytes Absolute 1.12 (H) 0.05 - 0.80 x10*3/uL    Eosinophils Absolute 0.39 0.00 - 0.40 x10*3/uL    Basophils Absolute 0.14 (H) 0.00 - 0.10 x10*3/uL   Troponin I, High Sensitivity   Result Value Ref Range    Troponin I, High Sensitivity 5 0 - 13 ng/L   B-Type Natriuretic Peptide    Result Value Ref Range     (H) 0 - 99 pg/mL   Comprehensive Metabolic Panel   Result Value Ref Range    Glucose 129 (H) 74 - 99 mg/dL    Sodium 136 136 - 145 mmol/L    Potassium 4.0 3.5 - 5.3 mmol/L    Chloride 100 98 - 107 mmol/L    Bicarbonate 25 21 - 32 mmol/L    Anion Gap 15 10 - 20 mmol/L    Urea Nitrogen 19 6 - 23 mg/dL    Creatinine 0.68 0.50 - 1.05 mg/dL    eGFR 90 >60 mL/min/1.73m*2    Calcium 9.7 8.6 - 10.3 mg/dL    Albumin 4.2 3.4 - 5.0 g/dL    Alkaline Phosphatase 80 33 - 136 U/L    Total Protein 7.0 6.4 - 8.2 g/dL    AST 18 9 - 39 U/L    Bilirubin, Total 0.5 0.0 - 1.2 mg/dL    ALT 16 7 - 45 U/L   Blood Gas Venous Full Panel   Result Value Ref Range    POCT pH, Venous 7.38 7.33 - 7.43 pH    POCT pCO2, Venous 50 41 - 51 mm Hg    POCT pO2, Venous 56 (H) 35 - 45 mm Hg    POCT SO2, Venous 86 (H) 45 - 75 %    POCT Oxy Hemoglobin, Venous 84.1 (H) 45.0 - 75.0 %    POCT Hematocrit Calculated, Venous 46.0 36.0 - 46.0 %    POCT Sodium, Venous 131 (L) 136 - 145 mmol/L    POCT Potassium, Venous 6.1 (HH) 3.5 - 5.3 mmol/L    POCT Chloride, Venous 101 98 - 107 mmol/L    POCT Ionized Calicum, Venous 1.17 1.10 - 1.33 mmol/L    POCT Glucose, Venous 133 (H) 74 - 99 mg/dL    POCT Lactate, Venous 1.0 0.4 - 2.0 mmol/L    POCT Base Excess, Venous 3.3 (H) -2.0 - 3.0 mmol/L    POCT HCO3 Calculated, Venous 29.6 (H) 22.0 - 26.0 mmol/L    POCT Hemoglobin, Venous 15.2 12.0 - 16.0 g/dL    POCT Anion Gap, Venous 7.0 (L) 10.0 - 25.0 mmol/L    Patient Temperature 37.0 degrees Celsius    FiO2 36 %    Critical Called By JOSE MCCALLUM RRT     Critical Called To CNP CHATAL     Critical Call Time 1750     Critical Read Back Y    Magnesium   Result Value Ref Range    Magnesium 1.85 1.60 - 2.40 mg/dL   Influenza A, and B PCR   Result Value Ref Range    Flu A Result Not Detected Not Detected    Flu B Result Not Detected Not Detected   RSV PCR   Result Value Ref Range    RSV PCR Not Detected Not Detected   Sars-CoV-2 PCR   Result Value  Ref Range    Coronavirus 2019, PCR Not Detected Not Detected      XR chest 1 view    Result Date: 4/3/2025  Interpreted By:  Hilario Mauro, STUDY: XR CHEST 1 VIEW;  4/3/2025 5:56 pm   INDICATION: Signs/Symptoms:Short of breath.     COMPARISON: None.   ACCESSION NUMBER(S): [id number]   ORDERING CLINICIAN: ANA LEBLANC   FINDINGS:         CARDIOMEDIASTINAL SILHOUETTE: Cardiomediastinal silhouette is normal in size and configuration.   LUNGS: The lungs are hyperinflated but clear. There is no consolidation or effusion there is no edema   ABDOMEN: No remarkable upper abdominal findings.   BONES: No acute osseous changes.       Hyperinflated lungs. No evidence of acute cardiopulmonary process.     MACRO: None   Signed by: Hilario Mauro 4/3/2025 6:02 PM Dictation workstation:   [id number]       Assessment/Plan   Assessment & Plan  COPD exacerbation (Multi)    Shortness of breath    Wheezing    Hypoxia    Tachypnea      Radha Logan is a 79 y.o. female presents to the ED with complaints of shortness of breath.  States her symptoms been ongoing for the past 4 days.  Reports shortness of breath worse with activity/movement and improves at rest. States she normally wears 3 L of home oxygen at baseline, however has increased it to 5 L over the past couple days (was recently put on home oxygen a month ago).  Says she has noticed herself wheezing quite a bit at home.  Also reports a nonproductive cough over the past couple days.  Per EMR review, patient with recent admission at the beginning of March for similar symptoms, advised to follow-up with pulmonology outpatient and also cardiothoracic surgery for stable aortic aneurysm.     #Suspect COPD exacerbation  #Shortness of breath  #Wheezing  #Hypoxia  #Tachypnea  #Mild leukocytosis, 13.2  Admit for observation with telemetry monitor  COVID, flu, RSV negative.  Chest x-ray shows hyperinflated lungs, no evidence of acute cardiopulmonary process.  No consolidation.  IV  azithromycin x 3 days  Patient given 125 mg IV Solu-Medrol in the ED, continue with 40 mg every 8 hours  Continuous oxygen, currently on 5 L, titrate back to baseline of 3 L.  Breathing treatments, Tylenol, Zofran as needed  Atarax as needed for anxiety  Cardiac/diabetic diet  Q 4 vitals  CBC, BMP in a.m.  PT/OT for evaluation and treatment  Patient states she has not smoked in the past month  --Has appointment with pulmonology scheduled for next Wednesday    #Mildly elevated BNP, 118  #Dyspnea on exertion  Echocardiogram ordered for further evaluation, defer cardiology consult to attending pending results    Continue home medications as appropriate once med rec is completed     Chronic conditions:  COPD, hypothyroidism, aneurysm of descending thoracic aorta, chronic sinusitis, anxiety, history of NSTEMI, HLD, HTN, RBBB, hearing loss, tobacco use disorder, prediabetes    #DVT prophylaxis  Lovenox daily  SCDs, ambulation       I spent 45 minutes in the professional and overall care of this patient.    Cole Eagle PA-C

## 2025-04-04 NOTE — CARE PLAN
The patient's goals for the shift include Better breathing    The clinical goals for the shift include antibiotics and improvement of breathing

## 2025-04-04 NOTE — H&P
History Of Present Illness  Radha Logan is a 79 y.o. 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  States her symptoms been ongoing for the past 4 days.  Reports shortness of breath worse with activity/movement and improves at rest.  Denies any recent travel, denies any sick contacts.  States she normally wears 3 L of home oxygen at baseline, however has increased it to 5 L over the past couple days.  Says she has noticed herself wheezing quite a bit at home.  Also reports a nonproductive cough over the past couple days.  Per EMR review, patient with recent admission at the beginning of March for similar symptoms, advised to follow-up with pulmonology outpatient and also cardiothoracic surgery for stable aortic aneurysm.  Patient sent home with breathing treatments and also oxygen, had only started using oxygen after that admission.  Patient is following up with her pulmonology appointment on Wednesday of next week she states.  States that shortness of breath is slightly improved in the ED after breathing treatments.  Denies headaches, dizziness, chest pains, abdominal pain or nausea, appetite changes, urinary/bowel changes, or fever/chills.  Patient does states she quit smoking approximately a month ago, around the time she came in to the hospital for her recent admission.    ED course: On arrival to the ED, patient afebrile, tachypneic with respirations 24, hypertensive with blood pressure 164/81, pulse 70, and SpO2 96% on 3 L oxygen via nasal cannula.  Glucose 129, electrolytes WNL, renal function WNL, LFTs WNL.  Magnesium 1.85, troponin 5.  .  WBC 13.2, hemoglobin 14.8/hematocrit 47.4, platelets 308.  COVID, flu, RSV negative.  VBG shows pH 7.38,  pCO2 50, pO2 56, HCO3 29.6.  Chest x-ray shows hyperinflated lungs, no evidence of acute cardiopulmonary process.  No consolidation.  Patient given 125 mg IV Solu-Medrol, DuoNeb, and albuterol in the ED.    EKG at 1821 (interpreted by ED physician): Sinus rhythm, with PVC and RBBB, rate of 68 bpm, no ST elevation, there is T wave inversion however in lead III that is similar in comparison to previous EKG, MO interval 176, , QTc 467.    Admitting providers Dr. Saldivar     Past Medical History  Past Medical History:   Diagnosis Date    Lung nodule     Vertigo 2009     Surgical History  Past Surgical History:   Procedure Laterality Date     SECTION, CLASSIC       Social History  She reports that she has quit smoking. Her smoking use included cigarettes. She started smoking about 45 years ago. She has a 45.3 pack-year smoking history. She has never used smokeless tobacco. She reports that she does not currently use alcohol. She reports that she does not currently use drugs.    Family History  Family History   Problem Relation Name Age of Onset    Brain cancer Sister      Lung cancer Sister      Lung cancer Niece       Allergies  Other, Tetracycline, Azithromycin, Codeine, Doxycycline, Levaquin [levofloxacin], and Penicillins    Review of Systems  10 point review of system negative except as noted above in HPI    Physical Exam  Constitutional:       General: She is not in acute distress.     Appearance: Normal appearance. She is not toxic-appearing.   HENT:      Head: Normocephalic and atraumatic.      Mouth/Throat:      Mouth: Mucous membranes are moist.      Pharynx: Oropharynx is clear.   Eyes:      Conjunctiva/sclera: Conjunctivae normal.   Cardiovascular:      Rate and Rhythm: Normal rate and regular rhythm.      Pulses: Normal pulses.      Heart sounds: Normal heart sounds.   Pulmonary:      Breath sounds: Wheezing (Bilateral throughout all lung fields.) present.      Comments: Tachypnea.   "Hypoxic, currently 92% on 5 L oxygen via nasal cannula.  Mild conversational dyspnea.  Abdominal:      General: Bowel sounds are normal. There is no distension.      Palpations: Abdomen is soft.      Tenderness: There is no abdominal tenderness.   Musculoskeletal:         General: No tenderness. Normal range of motion.      Right lower leg: No edema.      Left lower leg: No edema.   Skin:     General: Skin is warm and dry.   Neurological:      General: No focal deficit present.      Mental Status: She is alert and oriented to person, place, and time.   Psychiatric:         Mood and Affect: Mood normal.         Behavior: Behavior normal.       Last Recorded Vitals  Blood pressure 124/58, pulse 66, temperature 35.8 °C (96.4 °F), resp. rate 18, height 1.626 m (5' 4\"), weight 47.6 kg (105 lb), SpO2 91%.    Relevant Results  Results for orders placed or performed during the hospital encounter of 04/03/25 (from the past 24 hours)   CBC and Auto Differential   Result Value Ref Range    WBC 13.2 (H) 4.4 - 11.3 x10*3/uL    nRBC 0.0 0.0 - 0.0 /100 WBCs    RBC 4.90 4.00 - 5.20 x10*6/uL    Hemoglobin 14.8 12.0 - 16.0 g/dL    Hematocrit 47.4 (H) 36.0 - 46.0 %    MCV 97 80 - 100 fL    MCH 30.2 26.0 - 34.0 pg    MCHC 31.2 (L) 32.0 - 36.0 g/dL    RDW 14.4 11.5 - 14.5 %    Platelets 308 150 - 450 x10*3/uL    Neutrophils % 76.0 40.0 - 80.0 %    Immature Granulocytes %, Automated 0.4 0.0 - 0.9 %    Lymphocytes % 11.0 13.0 - 44.0 %    Monocytes % 8.5 2.0 - 10.0 %    Eosinophils % 3.0 0.0 - 6.0 %    Basophils % 1.1 0.0 - 2.0 %    Neutrophils Absolute 10.07 (H) 1.60 - 5.50 x10*3/uL    Immature Granulocytes Absolute, Automated 0.05 0.00 - 0.50 x10*3/uL    Lymphocytes Absolute 1.45 0.80 - 3.00 x10*3/uL    Monocytes Absolute 1.12 (H) 0.05 - 0.80 x10*3/uL    Eosinophils Absolute 0.39 0.00 - 0.40 x10*3/uL    Basophils Absolute 0.14 (H) 0.00 - 0.10 x10*3/uL   Troponin I, High Sensitivity   Result Value Ref Range    Troponin I, High Sensitivity " 5 0 - 13 ng/L   B-Type Natriuretic Peptide   Result Value Ref Range     (H) 0 - 99 pg/mL   Comprehensive Metabolic Panel   Result Value Ref Range    Glucose 129 (H) 74 - 99 mg/dL    Sodium 136 136 - 145 mmol/L    Potassium 4.0 3.5 - 5.3 mmol/L    Chloride 100 98 - 107 mmol/L    Bicarbonate 25 21 - 32 mmol/L    Anion Gap 15 10 - 20 mmol/L    Urea Nitrogen 19 6 - 23 mg/dL    Creatinine 0.68 0.50 - 1.05 mg/dL    eGFR 90 >60 mL/min/1.73m*2    Calcium 9.7 8.6 - 10.3 mg/dL    Albumin 4.2 3.4 - 5.0 g/dL    Alkaline Phosphatase 80 33 - 136 U/L    Total Protein 7.0 6.4 - 8.2 g/dL    AST 18 9 - 39 U/L    Bilirubin, Total 0.5 0.0 - 1.2 mg/dL    ALT 16 7 - 45 U/L   Blood Gas Venous Full Panel   Result Value Ref Range    POCT pH, Venous 7.38 7.33 - 7.43 pH    POCT pCO2, Venous 50 41 - 51 mm Hg    POCT pO2, Venous 56 (H) 35 - 45 mm Hg    POCT SO2, Venous 86 (H) 45 - 75 %    POCT Oxy Hemoglobin, Venous 84.1 (H) 45.0 - 75.0 %    POCT Hematocrit Calculated, Venous 46.0 36.0 - 46.0 %    POCT Sodium, Venous 131 (L) 136 - 145 mmol/L    POCT Potassium, Venous 6.1 (HH) 3.5 - 5.3 mmol/L    POCT Chloride, Venous 101 98 - 107 mmol/L    POCT Ionized Calicum, Venous 1.17 1.10 - 1.33 mmol/L    POCT Glucose, Venous 133 (H) 74 - 99 mg/dL    POCT Lactate, Venous 1.0 0.4 - 2.0 mmol/L    POCT Base Excess, Venous 3.3 (H) -2.0 - 3.0 mmol/L    POCT HCO3 Calculated, Venous 29.6 (H) 22.0 - 26.0 mmol/L    POCT Hemoglobin, Venous 15.2 12.0 - 16.0 g/dL    POCT Anion Gap, Venous 7.0 (L) 10.0 - 25.0 mmol/L    Patient Temperature 37.0 degrees Celsius    FiO2 36 %    Critical Called By JOSE MCCALLUM RRT     Critical Called To CNP CHATAL     Critical Call Time 1750     Critical Read Back Y    Magnesium   Result Value Ref Range    Magnesium 1.85 1.60 - 2.40 mg/dL   Influenza A, and B PCR   Result Value Ref Range    Flu A Result Not Detected Not Detected    Flu B Result Not Detected Not Detected   RSV PCR   Result Value Ref Range    RSV PCR Not Detected  Not Detected   Sars-CoV-2 PCR   Result Value Ref Range    Coronavirus 2019, PCR Not Detected Not Detected   ECG 12 Lead   Result Value Ref Range    Ventricular Rate 68 BPM    Atrial Rate 68 BPM    VA Interval 176 ms    QRS Duration 158 ms    QT Interval 440 ms    QTC Calculation(Bazett) 467 ms    P Axis 71 degrees    R Axis 114 degrees    T Axis 19 degrees    QRS Count 11 beats    Q Onset 222 ms    P Onset 134 ms    P Offset 195 ms    T Offset 442 ms    QTC Fredericia 459 ms   CBC   Result Value Ref Range    WBC 8.0 4.4 - 11.3 x10*3/uL    nRBC 0.0 0.0 - 0.0 /100 WBCs    RBC 4.50 4.00 - 5.20 x10*6/uL    Hemoglobin 13.4 12.0 - 16.0 g/dL    Hematocrit 43.1 36.0 - 46.0 %    MCV 96 80 - 100 fL    MCH 29.8 26.0 - 34.0 pg    MCHC 31.1 (L) 32.0 - 36.0 g/dL    RDW 14.2 11.5 - 14.5 %    Platelets 325 150 - 450 x10*3/uL   Basic metabolic panel   Result Value Ref Range    Glucose 147 (H) 74 - 99 mg/dL    Sodium 135 (L) 136 - 145 mmol/L    Potassium 4.5 3.5 - 5.3 mmol/L    Chloride 99 98 - 107 mmol/L    Bicarbonate 28 21 - 32 mmol/L    Anion Gap 13 10 - 20 mmol/L    Urea Nitrogen 24 (H) 6 - 23 mg/dL    Creatinine 0.84 0.50 - 1.05 mg/dL    eGFR 72 >60 mL/min/1.73m*2    Calcium 9.3 8.6 - 10.3 mg/dL   Transthoracic echo (TTE) complete   Result Value Ref Range    AV pk magdalena 1.32 m/s    AV mn grad 4 mmHg    LVOT diam 1.90 cm    MV E/A ratio 0.70     LA vol index A/L 10.7 ml/m2    Tricuspid annular plane systolic excursion 2.1 cm    LV EF 60 %    RV free wall pk S' 17.20 cm/s    LVIDd 3.70 cm    Aortic Valve Area by Continuity of VTI 1.73 cm2    Aortic Valve Area by Continuity of Peak Velocity 2.17 cm2    AV pk grad 7 mmHg    LV A4C EF 63.6       XR chest 1 view    Result Date: 4/3/2025  Interpreted By:  Hilario Mauro, STUDY: XR CHEST 1 VIEW;  4/3/2025 5:56 pm   INDICATION: Signs/Symptoms:Short of breath.     COMPARISON: None.   ACCESSION NUMBER(S): [id number]   ORDERING CLINICIAN: ANA LEBLANC   FINDINGS:         CARDIOMEDIASTINAL  SILHOUETTE: Cardiomediastinal silhouette is normal in size and configuration.   LUNGS: The lungs are hyperinflated but clear. There is no consolidation or effusion there is no edema   ABDOMEN: No remarkable upper abdominal findings.   BONES: No acute osseous changes.       Hyperinflated lungs. No evidence of acute cardiopulmonary process.     MACRO: None   Signed by: Hilario Mauro 4/3/2025 6:02 PM Dictation workstation:   [id number]       Assessment/Plan   Assessment & Plan  COPD exacerbation (Multi)    Shortness of breath    Wheezing    Hypoxia    Tachypnea      Radha Logan is a 79 y.o. female presents to the ED with complaints of shortness of breath.  States her symptoms been ongoing for the past 4 days.  Reports shortness of breath worse with activity/movement and improves at rest. States she normally wears 3 L of home oxygen at baseline, however has increased it to 5 L over the past couple days (was recently put on home oxygen a month ago).  Says she has noticed herself wheezing quite a bit at home.  Also reports a nonproductive cough over the past couple days.  Per EMR review, patient with recent admission at the beginning of March for similar symptoms, advised to follow-up with pulmonology outpatient and also cardiothoracic surgery for stable aortic aneurysm.     #Suspect COPD exacerbation  #Shortness of breath  #Wheezing  #Hypoxia  #Tachypnea  #Mild leukocytosis, 13.2  Admit for observation with telemetry monitor  COVID, flu, RSV negative.  Chest x-ray shows hyperinflated lungs, no evidence of acute cardiopulmonary process.  No consolidation.  IV azithromycin x 3 days  Patient given 125 mg IV Solu-Medrol in the ED, continue with 40 mg every 8 hours  Continuous oxygen, currently on 5 L, titrate back to baseline of 3 L.  Breathing treatments, Tylenol, Zofran as needed  Atarax as needed for anxiety  Cardiac/diabetic diet  Q 4 vitals  CBC, BMP in a.m.  PT/OT for evaluation and treatment  Patient states she  has not smoked in the past month  --Has appointment with pulmonology scheduled for next Wednesday    #Mildly elevated BNP, 118  #Dyspnea on exertion  Echocardiogram ordered for further evaluation, defer cardiology consult to attending pending results    Continue home medications as appropriate once med rec is completed     Chronic conditions:  COPD, hypothyroidism, aneurysm of descending thoracic aorta, chronic sinusitis, anxiety, history of NSTEMI, HLD, HTN, RBBB, hearing loss, tobacco use disorder, prediabetes    #DVT prophylaxis  Lovenox daily  SCDs, ambulation       I spent 45 minutes in the professional and overall care of this patient.    Trev Saldivar, DO

## 2025-04-04 NOTE — CARE PLAN
The patient's goals for the shift include Better breathing    The clinical goals for the shift include Steriods and watch o2 sats    Over the shift, the patient did not make progress toward the following goals. Barriers to progression include SOB at rest . Recommendations to address these barriers include Steriods and assistance with adl's/Transfers  Problem: Pain - Adult  Goal: Verbalizes/displays adequate comfort level or baseline comfort level  Outcome: Progressing     Problem: Safety - Adult  Goal: Free from fall injury  Outcome: Progressing   .

## 2025-04-04 NOTE — PROGRESS NOTES
Occupational Therapy    Evaluation    Patient Name: Radha Logan  MRN: [id number]  Department: [facility] 3  Room: [address] [place] [address]  Today's Date: 4/4/2025  Time Calculation  Start Time: 1030  Stop Time: 1049  Time Calculation (min): 19 min    Assessment  IP OT Assessment  OT Assessment: Pt demonstrates a decline in adl/functional mobility. Recommend   low intensity OT tx intervention 2x/week. Good prognosis for goal attainment  End of Session Communication: Bedside nurse  Plan:  Treatment Interventions: ADL retraining, Functional transfer training  OT Frequency: 2 times per week  OT Discharge Recommendations: Low intensity level of continued care  Equipment Recommended upon Discharge: Wheeled walker  OT - OK to Discharge: Yes (okay  to discharge once cleared by medical team)    Subjective   Current Problem:  1. Shortness of breath  Transthoracic echo (TTE) complete    Transthoracic echo (TTE) complete      2. COPD exacerbation (Multi)        3. Hypoxia        4. Dyspnea, unspecified  Transthoracic echo (TTE) complete      5. Other forms of dyspnea  Transthoracic echo (TTE) complete        General:  General  Reason for Referral: OT eval/tx/ impaired functional daily living skills  Referred By: Cole Eagle PA-C  Past Medical History Relevant to Rehab: Pt admitted 4/3 with SOB and increased O2 needs from 2L to 4L at home. Found to have COPD exacerbation. 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, NSTEMI)  Co-Treatment: PT  Co-Treatment Reason: maximize pt safety  Prior to Session Communication: Bedside nurse  Patient Position Received: Bed, 2 rail up  General Comment: Pt agreeable to OT tx intervention  Precautions:  Medical Precautions: Fall precautions  Precautions Comment: fall (4-5L O2 NC)     Date/Time Vitals Session Patient Position Pulse Resp SpO2 BP MAP (mmHg)    04/04/25 13:57:56 --  --   66  --  91 %  124/58  84                Pain:  Pain Assessment  Pain Assessment: 0-10  0-10 (Numeric) Pain Score: 0 - No pain    Objective   Cognition:  Overall Cognitive Status: Within Functional Limits           Home Living:  Home Living Comments: Pt lives at home with her  in a single level house with level entry and has tub/shower with standard toilet. No device at home. Drives and is ind with ADLs. Ind with IADLs as well.   Prior Function:  Level of [place]: Independent with ADLs and functional transfers, Independent with homemaking with ambulation  Prior Function Comments: Drives and shops  IADL History:pta indep , shared  with spouse     ADL:indep pta admit without adl equip     Activity Tolerance:  Endurance: Decreased tolerance for upright activites  Bed Mobility/Transfers: Bed Mobility  Bed Mobility:  (completed supine to sit with supervision. Spo2 in sitting 96% on 5L NC)    Transfers  Transfer:  (from EOB to no device with CGA and demos slight unsteadiness. Denies dizziness.)      Functional Mobility:  Functional Mobility  Functional Mobility Performed:  (functional mobility with wheeled walker  requiring CGA/min a without wheeled walker)       IADL's: pta - indep/shared     Vision: Vision - Basic Assessment  Current Vision: No visual deficits  Sensation:  Light Touch: No apparent deficits  Strength:  Strength Comments: strength below elbow wfl  Perception:wfl     Coordination:wfl      Hand Function:  Hand Function  Gross Grasp: Functional  Extremities: RUE   RUE : Within Functional Limits and LUE   LUE: Within Functional Limits    Outcome Measures: [facility] Daily Activity  Putting on and taking off regular lower body clothing: A lot  Bathing (including washing, rinsing, drying): A lot  Putting on and taking off regular upper body clothing: A little  Toileting, which includes using toilet, bedpan or urinal: A little  Taking care of personal grooming such as brushing teeth: A little (sink  level)  Eating Meals: None  Daily Activity - Total Score: 17      Education Documentation  Handouts, taught by Ruth Ortega OT at 4/4/2025  3:43 PM.  Learner: Patient  Readiness: Acceptance  Method: Demonstration  Response: Demonstrated Understanding, Needs Reinforcement    Precautions, taught by Ruth Ortega OT at 4/4/2025  3:43 PM.  Learner: Patient  Readiness: Acceptance  Method: Demonstration  Response: Demonstrated Understanding, Needs Reinforcement    ADL Training, taught by Ruth Ortega OT at 4/4/2025  3:43 PM.  Learner: Patient  Readiness: Acceptance  Method: Demonstration  Response: Demonstrated Understanding, Needs Reinforcement    Handouts, taught by Ruth Ortega OT at 4/4/2025  3:43 PM.  Learner: Patient  Readiness: Acceptance  Method: Demonstration  Response: Demonstrated Understanding, Needs Reinforcement    Precautions, taught by Ruth Ortega OT at 4/4/2025  3:43 PM.  Learner: Patient  Readiness: Acceptance  Method: Demonstration  Response: Demonstrated Understanding, Needs Reinforcement    ADL Training, taught by Ruth Ortega OT at 4/4/2025  3:43 PM.  Learner: Patient  Readiness: Acceptance  Method: Demonstration  Response: Demonstrated Understanding, Needs Reinforcement    Education Comments  No comments found.      Goals:   Encounter Problems       Encounter Problems (Active)       impaired functional daily living skills       Pt will increase Grooming to s/mod indep   Upper Body Bathing to s/mod indep     Lower Body Bathing  to s/mod indep    Increase Upper Body Dressing  to s/mod indep    LE Dressing to s/mod indep with/ without adaptive equipment as needed (Progressing)       Start:  04/04/25    Expected End:  04/18/25            Pt will increase Functional Transfers Bed Mobility to s/mod indep    Sit to Stand  to s/mod indep    Functional Mobility  with /without a device to s/mod indep  chair/toliet/room to increase indep/safety in patients discharge  environment (Progressing)       Start:  04/04/25    Expected End:  04/18/25            Pt will increase  energy conservation /work simplification/diaphragmatic breathing performance to s/indep assistance to increase independence and safety  within  the patient's discharge environment  (Progressing)       Start:  04/04/25    Expected End:  04/18/25

## 2025-04-05 LAB
ANION GAP SERPL CALC-SCNC: 13 MMOL/L (ref 10–20)
ATRIAL RATE: 68 BPM
BUN SERPL-MCNC: 30 MG/DL (ref 6–23)
CALCIUM SERPL-MCNC: 9.9 MG/DL (ref 8.6–10.3)
CHLORIDE SERPL-SCNC: 101 MMOL/L (ref 98–107)
CO2 SERPL-SCNC: 30 MMOL/L (ref 21–32)
CREAT SERPL-MCNC: 0.97 MG/DL (ref 0.5–1.05)
EGFRCR SERPLBLD CKD-EPI 2021: 60 ML/MIN/1.73M*2
ERYTHROCYTE [DISTWIDTH] IN BLOOD BY AUTOMATED COUNT: 14.7 % (ref 11.5–14.5)
GLUCOSE SERPL-MCNC: 138 MG/DL (ref 74–99)
HCT VFR BLD AUTO: 43.4 % (ref 36–46)
HGB BLD-MCNC: 13.5 G/DL (ref 12–16)
MCH RBC QN AUTO: 29.9 PG (ref 26–34)
MCHC RBC AUTO-ENTMCNC: 31.1 G/DL (ref 32–36)
MCV RBC AUTO: 96 FL (ref 80–100)
NRBC BLD-RTO: 0 /100 WBCS (ref 0–0)
P AXIS: 71 DEGREES
P OFFSET: 195 MS
P ONSET: 134 MS
PLATELET # BLD AUTO: 363 X10*3/UL (ref 150–450)
POTASSIUM SERPL-SCNC: 4.8 MMOL/L (ref 3.5–5.3)
PR INTERVAL: 176 MS
Q ONSET: 222 MS
QRS COUNT: 11 BEATS
QRS DURATION: 158 MS
QT INTERVAL: 440 MS
QTC CALCULATION(BAZETT): 467 MS
QTC FREDERICIA: 459 MS
R AXIS: 114 DEGREES
RBC # BLD AUTO: 4.52 X10*6/UL (ref 4–5.2)
SODIUM SERPL-SCNC: 139 MMOL/L (ref 136–145)
T AXIS: 19 DEGREES
T OFFSET: 442 MS
VENTRICULAR RATE: 68 BPM
WBC # BLD AUTO: 24.3 X10*3/UL (ref 4.4–11.3)

## 2025-04-05 PROCEDURE — 1200000002 HC GENERAL ROOM WITH TELEMETRY DAILY

## 2025-04-05 PROCEDURE — 80048 BASIC METABOLIC PNL TOTAL CA: CPT | Performed by: INTERNAL MEDICINE

## 2025-04-05 PROCEDURE — 2500000004 HC RX 250 GENERAL PHARMACY W/ HCPCS (ALT 636 FOR OP/ED): Performed by: PHYSICIAN ASSISTANT

## 2025-04-05 PROCEDURE — 2500000002 HC RX 250 W HCPCS SELF ADMINISTERED DRUGS (ALT 637 FOR MEDICARE OP, ALT 636 FOR OP/ED): Performed by: INTERNAL MEDICINE

## 2025-04-05 PROCEDURE — 85027 COMPLETE CBC AUTOMATED: CPT | Performed by: INTERNAL MEDICINE

## 2025-04-05 PROCEDURE — 2500000001 HC RX 250 WO HCPCS SELF ADMINISTERED DRUGS (ALT 637 FOR MEDICARE OP): Performed by: PHYSICIAN ASSISTANT

## 2025-04-05 PROCEDURE — 2500000004 HC RX 250 GENERAL PHARMACY W/ HCPCS (ALT 636 FOR OP/ED): Performed by: INTERNAL MEDICINE

## 2025-04-05 PROCEDURE — 2500000005 HC RX 250 GENERAL PHARMACY W/O HCPCS: Performed by: PHYSICIAN ASSISTANT

## 2025-04-05 PROCEDURE — 36415 COLL VENOUS BLD VENIPUNCTURE: CPT | Performed by: INTERNAL MEDICINE

## 2025-04-05 PROCEDURE — 94640 AIRWAY INHALATION TREATMENT: CPT

## 2025-04-05 RX ADMIN — ATORVASTATIN CALCIUM 80 MG: 80 TABLET, FILM COATED ORAL at 09:36

## 2025-04-05 RX ADMIN — AMLODIPINE BESYLATE 5 MG: 5 TABLET ORAL at 09:35

## 2025-04-05 RX ADMIN — Medication 3 L/MIN: at 07:05

## 2025-04-05 RX ADMIN — IPRATROPIUM BROMIDE AND ALBUTEROL SULFATE 3 ML: .5; 3 SOLUTION RESPIRATORY (INHALATION) at 07:04

## 2025-04-05 RX ADMIN — AZITHROMYCIN MONOHYDRATE 500 MG: 500 INJECTION, POWDER, LYOPHILIZED, FOR SOLUTION INTRAVENOUS at 20:25

## 2025-04-05 RX ADMIN — IPRATROPIUM BROMIDE AND ALBUTEROL SULFATE 3 ML: .5; 3 SOLUTION RESPIRATORY (INHALATION) at 01:30

## 2025-04-05 RX ADMIN — METHYLPREDNISOLONE SODIUM SUCCINATE 40 MG: 40 INJECTION, POWDER, FOR SOLUTION INTRAMUSCULAR; INTRAVENOUS at 22:22

## 2025-04-05 RX ADMIN — ESCITALOPRAM OXALATE 10 MG: 10 TABLET ORAL at 09:35

## 2025-04-05 RX ADMIN — GABAPENTIN 300 MG: 300 CAPSULE ORAL at 20:25

## 2025-04-05 RX ADMIN — TRAZODONE HYDROCHLORIDE 100 MG: 50 TABLET ORAL at 20:25

## 2025-04-05 RX ADMIN — METHYLPREDNISOLONE SODIUM SUCCINATE 40 MG: 40 INJECTION, POWDER, FOR SOLUTION INTRAMUSCULAR; INTRAVENOUS at 14:35

## 2025-04-05 RX ADMIN — IPRATROPIUM BROMIDE AND ALBUTEROL SULFATE 3 ML: .5; 3 SOLUTION RESPIRATORY (INHALATION) at 12:22

## 2025-04-05 RX ADMIN — ONDANSETRON HYDROCHLORIDE 4 MG: 4 TABLET, FILM COATED ORAL at 20:37

## 2025-04-05 RX ADMIN — GABAPENTIN 300 MG: 300 CAPSULE ORAL at 09:36

## 2025-04-05 RX ADMIN — TRAMADOL HYDROCHLORIDE 50 MG: 50 TABLET, COATED ORAL at 10:06

## 2025-04-05 RX ADMIN — GUAIFENESIN 1200 MG: 600 TABLET, EXTENDED RELEASE ORAL at 20:25

## 2025-04-05 RX ADMIN — PSYLLIUM HUSK 1 PACKET: 3.4 POWDER ORAL at 09:36

## 2025-04-05 RX ADMIN — METOPROLOL SUCCINATE 50 MG: 50 TABLET, EXTENDED RELEASE ORAL at 09:36

## 2025-04-05 RX ADMIN — GUAIFENESIN 1200 MG: 600 TABLET, EXTENDED RELEASE ORAL at 09:36

## 2025-04-05 RX ADMIN — ASPIRIN 81 MG: 81 TABLET, COATED ORAL at 09:36

## 2025-04-05 RX ADMIN — LISINOPRIL 40 MG: 40 TABLET ORAL at 09:36

## 2025-04-05 RX ADMIN — Medication 32 PERCENT: at 19:49

## 2025-04-05 RX ADMIN — HYDROXYZINE HYDROCHLORIDE 25 MG: 25 TABLET ORAL at 18:02

## 2025-04-05 RX ADMIN — ENOXAPARIN SODIUM 40 MG: 40 INJECTION SUBCUTANEOUS at 20:25

## 2025-04-05 RX ADMIN — IPRATROPIUM BROMIDE AND ALBUTEROL SULFATE 3 ML: .5; 3 SOLUTION RESPIRATORY (INHALATION) at 18:24

## 2025-04-05 RX ADMIN — METHYLPREDNISOLONE SODIUM SUCCINATE 40 MG: 40 INJECTION, POWDER, FOR SOLUTION INTRAMUSCULAR; INTRAVENOUS at 06:19

## 2025-04-05 RX ADMIN — TRAMADOL HYDROCHLORIDE 50 MG: 50 TABLET, COATED ORAL at 20:27

## 2025-04-05 ASSESSMENT — PAIN DESCRIPTION - LOCATION
LOCATION: BACK
LOCATION: BACK

## 2025-04-05 ASSESSMENT — COGNITIVE AND FUNCTIONAL STATUS - GENERAL
MOBILITY SCORE: 21
HELP NEEDED FOR BATHING: A LITTLE
DRESSING REGULAR LOWER BODY CLOTHING: A LITTLE
PERSONAL GROOMING: A LITTLE
WALKING IN HOSPITAL ROOM: A LITTLE
DRESSING REGULAR UPPER BODY CLOTHING: A LITTLE
MOVING TO AND FROM BED TO CHAIR: A LITTLE
TOILETING: A LITTLE
STANDING UP FROM CHAIR USING ARMS: A LITTLE
DAILY ACTIVITIY SCORE: 19

## 2025-04-05 ASSESSMENT — PAIN SCALES - GENERAL
PAINLEVEL_OUTOF10: 5 - MODERATE PAIN
PAINLEVEL_OUTOF10: 0 - NO PAIN
PAINLEVEL_OUTOF10: 4
PAINLEVEL_OUTOF10: 6

## 2025-04-05 ASSESSMENT — PAIN DESCRIPTION - ORIENTATION: ORIENTATION: LOWER

## 2025-04-05 ASSESSMENT — PAIN - FUNCTIONAL ASSESSMENT
PAIN_FUNCTIONAL_ASSESSMENT: 0-10

## 2025-04-05 NOTE — CONSULTS
"Nutrition Initial Assessment:   Nutrition Assessment    Reason for Assessment: Dietitian discretion    Patient is a 79 y.o. female presenting with SOB      Nutrition History:  Food and Nutrient History: Pt seems to be eating well.  I saw her at 2 meals and she was actively eating with family around       Anthropometrics:  Height: 162.6 cm (5' 4\")   Weight: 47.6 kg (105 lb)   BMI (Calculated): 18.01  IBW/kg (Dietitian Calculated): 52 kg  Percent of IBW: 91 %                      Weight History:   Wt Readings from Last 10 Encounters:   04/03/25 47.6 kg (105 lb)   03/01/25 48.1 kg (106 lb)   11/16/22 51.7 kg (114 lb)   10/07/22 51.3 kg (113 lb)   09/28/22 50.8 kg (112 lb)         Weight Change %:  Weight History / % Weight Change: Pt was 115 lbs in 2022 per records.  Significant Weight Loss: No    Nutrition Focused Physical Exam Findings:    Subcutaneous Fat Loss:   Orbital Fat Pads: Mild-Moderate (slight dark circles and slight hollowing)  Buccal Fat Pads: Mild-Moderate (flat cheeks, minimal bounce)  Triceps: Mild-Moderate (less than ample fat tissue)  Muscle Wasting:  Temporalis: Mild-Moderate (slight depression)  Pectoralis (Clavicular Region): Mild-Moderate (some protrusion of clavicle)  Deltoid/Trapezius: Mild-Moderate (slight protrusion of acromion process)  Interosseous: Mild-Moderate (slightly depressed area between thumb and forefinger)  Edema:  Edema Location: some edema  Physical Findings:       Nutrition Significant Labs:  BMP Trend:   Results from last 7 days   Lab Units 04/04/25  0751 04/03/25  1744   GLUCOSE mg/dL 147* 129*   CALCIUM mg/dL 9.3 9.7   SODIUM mmol/L 135* 136   POTASSIUM mmol/L 4.5 4.0   CO2 mmol/L 28 25   CHLORIDE mmol/L 99 100   BUN mg/dL 24* 19   CREATININE mg/dL 0.84 0.68        Nutrition Specific Medications:  Norvasc; lipitor; lovenox; solu edrol; toprol XL; metamucil; zofran    I/O:    ;      Dietary Orders (From admission, onward)       Start     Ordered    04/04/25 2321  Oral " nutritional supplements  Until discontinued        Question Answer Comment   Deliver with Lunch    Select supplement: Glucerna Shake        04/04/25 2321 04/03/25 2113  May Participate in Room Service With Assistance  ( ROOM SERVICE MAY PARTICIPATE WITH ASSISTANCE)  Once        Question:  .  Answer:  Yes    04/03/25 2112 04/03/25 2043  Adult diet Cardiac, Consistent Carb; CCD 60 gm/meal; 70 gm fat; 2 - 3 grams Sodium  Diet effective now        Question Answer Comment   Diet type Cardiac    Diet type Consistent Carb    Carb diet selection: CCD 60 gm/meal    Fat restriction: 70 gm fat    Sodium restriction: 2 - 3 grams Sodium        04/03/25 2043                     Estimated Needs:      Method for Estimating Needs: 1089-6816  MSJ     Method for Estimating 24 Hour Protein Needs: 52-68  1-1.3 gm kg of iBW     Method for Estimating 24 Hour Fluid Needs: 6986-3857  20-30 ml kg of IBW as medically indicated  Patient on Order Fluid Restriction: No        Nutrition Diagnosis   Malnutrition Diagnosis  Patient has Malnutrition Diagnosis: No    Nutrition Diagnosis  Patient has Nutrition Diagnosis: Yes  Diagnosis Status (1): Active  Nutrition Diagnosis 1: Increased nutrient needs  Related to (1): COPD exacerbation  As Evidenced by (1): Pt has COPD and is 91% of her IBW       Nutrition Interventions/Recommendations   Nutrition prescription for oral nutrition    Nutrition Recommendations:  Individualized Nutrition Prescription Provided for : Continue 60 gm carb consistent cardiac diet as long as pt eats well.  sending glucerna at lunch to help meet nutritional needs    Nutrition Interventions/Goals:   Meals and Snacks: Carbohydrate-modified diet, Fat-modified diet, Mineral-modified diet  Goal: >75% of meals  Medical Food Supplement: Commercial beverage medical food supplement therapy  Goal: 100% of supplement  Coordination of Care with Providers: Nursing, Provider      Education Documentation  No documentation found.      Provided pt with info on carb counting and contact number.  Questions encouraged.        Nutrition Monitoring and Evaluation   Food/Nutrient Related History Monitoring  Monitoring and Evaluation Plan: Estimated Energy Intake, Fluid intake, Intake / amount of food  Estimated Energy Intake: Energy intake greater or equal to 75% of estimated energy needs  Fluid Intake:  (adequte fluid intake without fluid overload)  Intake / Amount of food: Meets > 75% estimated energy needs, Consumes at least 75% or more of meals/snacks/supplements    Anthropometric Measurements  Monitoring and Evaluation Plan: Body weight    Biochemical Data, Medical Tests and Procedures  Monitoring and Evaluation Plan: Electrolyte/renal panel, Glucose/endocrine profile  Criteria: improved sodium  Criteria: glucose within desired range

## 2025-04-05 NOTE — PROGRESS NOTES
Radha Logan is a 79 y.o. female on day 0 of admission presenting with COPD exacerbation (Multi).      Subjective   Patient fully evaluated  04/05  for    Problem List Items Addressed This Visit       Shortness of breath - Primary    Relevant Orders    Transthoracic echo (TTE) complete (Completed)    * (Principal) COPD exacerbation (Multi)    Hypoxia     Other Visit Diagnoses       Dyspnea, unspecified        Relevant Orders    Transthoracic echo (TTE) complete (Completed)    Other forms of dyspnea              Patient seen resting in bed with head of bed elevated, no s/s or c/o acute difficulties at this time.  Vital signs for last 24 hours Temp:  [35.7 °C (96.3 °F)-36.6 °C (97.9 °F)] 35.8 °C (96.4 °F)  Heart Rate:  [52-66] 64  Resp:  [16-18] 18  BP: (124-161)/(58-93) 147/67  FiO2 (%):  [32 %] 32 %    No intake/output data recorded.  Patient still requiring frequent cardiac and SPO2 monitoring. Continue aggressive pulmonary hygiene and oral hygiene. Off loading as tolerated for skin integrity. Medications and labs reviewed-   Results for orders placed or performed during the hospital encounter of 04/03/25 (from the past 24 hours)   CBC   Result Value Ref Range    WBC 24.3 (H) 4.4 - 11.3 x10*3/uL    nRBC 0.0 0.0 - 0.0 /100 WBCs    RBC 4.52 4.00 - 5.20 x10*6/uL    Hemoglobin 13.5 12.0 - 16.0 g/dL    Hematocrit 43.4 36.0 - 46.0 %    MCV 96 80 - 100 fL    MCH 29.9 26.0 - 34.0 pg    MCHC 31.1 (L) 32.0 - 36.0 g/dL    RDW 14.7 (H) 11.5 - 14.5 %    Platelets 363 150 - 450 x10*3/uL   Basic Metabolic Panel   Result Value Ref Range    Glucose 138 (H) 74 - 99 mg/dL    Sodium 139 136 - 145 mmol/L    Potassium 4.8 3.5 - 5.3 mmol/L    Chloride 101 98 - 107 mmol/L    Bicarbonate 30 21 - 32 mmol/L    Anion Gap 13 10 - 20 mmol/L    Urea Nitrogen 30 (H) 6 - 23 mg/dL    Creatinine 0.97 0.50 - 1.05 mg/dL    eGFR 60 (L) >60 mL/min/1.73m*2    Calcium 9.9 8.6 - 10.3 mg/dL      Patient recently received an antibiotic (last 12 hours)        None           Plan discussed with interdisciplinary team, TTE  with LVEF @ 60%, WBC increased to 24.3 likely secondary to IV steroids. Chest xray showing hyperinflated clear lungs, will continue current IV antibiotics, incentive spirometry and breathing exercises, will continue current plan, monitor overnight, and repeat labs in the AM.     Discharge planning discussed with patient and care team. Therapy evaluations ordered. Patient aware and agreeable to current plan, continue plan as above.     I spent a total of 50 minutes on the date of the service which included preparing to see the patient, face-to-face patient care, completing clinical documentation, obtaining and/or reviewing separately obtained history, performing a medically appropriate examination, counseling and educating the patient/family/caregiver, ordering medications, tests, or procedures, communicating with other HCPs (not separately reported), independently interpreting results (not separately reported), communicating results to the patient/family/caregiver, and care coordination (not separately reported).        Objective     Last Recorded Vitals  /67   Pulse 64   Temp 35.8 °C (96.4 °F) (Temporal)   Resp 18   Wt 47.6 kg (105 lb)   SpO2 95%   Intake/Output last 3 Shifts:  No intake or output data in the 24 hours ending 04/05/25 1333    Admission Weight  Weight: 47.6 kg (105 lb) (04/03/25 2202)    Daily Weight  04/03/25 : 47.6 kg (105 lb)    Image Results      Physical Exam  Vitals and nursing note reviewed.         Relevant Results               Assessment/Plan                  Assessment & Plan  COPD exacerbation (Multi)    Shortness of breath    Wheezing    Hypoxia    Tachypnea                   History Of Present Illness  Radha Logan is a 79 y.o. 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  States her symptoms been ongoing for the past 4 days.  Reports shortness of breath worse with activity/movement and improves at rest.  Denies any recent travel, denies any sick contacts.  States she normally wears 3 L of home oxygen at baseline, however has increased it to 5 L over the past couple days.  Says she has noticed herself wheezing quite a bit at home.  Also reports a nonproductive cough over the past couple days.  Per EMR review, patient with recent admission at the beginning of March for similar symptoms, advised to follow-up with pulmonology outpatient and also cardiothoracic surgery for stable aortic aneurysm.  Patient sent home with breathing treatments and also oxygen, had only started using oxygen after that admission.  Patient is following up with her pulmonology appointment on Wednesday of next week she states.  States that shortness of breath is slightly improved in the ED after breathing treatments.  Denies headaches, dizziness, chest pains, abdominal pain or nausea, appetite changes, urinary/bowel changes, or fever/chills.  Patient does states she quit smoking approximately a month ago, around the time she came in to the hospital for her recent admission.     ED course: On arrival to the ED, patient afebrile, tachypneic with respirations 24, hypertensive with blood pressure 164/81, pulse 70, and SpO2 96% on 3 L oxygen via nasal cannula.  Glucose 129, electrolytes WNL, renal function WNL, LFTs WNL.  Magnesium 1.85, troponin 5.  .  WBC 13.2, hemoglobin 14.8/hematocrit 47.4, platelets 308.  COVID, flu, RSV negative.  VBG shows pH 7.38, pCO2 50, pO2 56, HCO3 29.6.  Chest x-ray shows hyperinflated lungs, no evidence of acute cardiopulmonary process.  No consolidation.  Patient given 125 mg IV Solu-Medrol, DuoNeb, and albuterol in the ED.     EKG at 1821 (interpreted  by ED physician): Sinus rhythm, with PVC and RBBB, rate of 68 bpm, no ST elevation, there is T wave inversion however in lead III that is similar in comparison to previous EKG, IA interval 176, , QTc 467.     Admitting providers Dr. Saldivar     Past Medical History  Medical History        Past Medical History:   Diagnosis Date    Lung nodule      Vertigo 2009         Surgical History  Surgical History         Past Surgical History:   Procedure Laterality Date     SECTION, CLASSIC             Social History  She reports that she has quit smoking. Her smoking use included cigarettes. She started smoking about 45 years ago. She has a 45.3 pack-year smoking history. She has never used smokeless tobacco. She reports that she does not currently use alcohol. She reports that she does not currently use drugs.     Family History  Family History          Family History   Problem Relation Name Age of Onset    Brain cancer Sister        Lung cancer Sister        Lung cancer Niece             Allergies  Other, Tetracycline, Azithromycin, Codeine, Doxycycline, Levaquin [levofloxacin], and Penicillins     Review of Systems  10 point review of system negative except as noted above in HPI     Physical Exam  Constitutional:       General: She is not in acute distress.     Appearance: Normal appearance. She is not toxic-appearing.   HENT:      Head: Normocephalic and atraumatic.      Mouth/Throat:      Mouth: Mucous membranes are moist.      Pharynx: Oropharynx is clear.   Eyes:      Conjunctiva/sclera: Conjunctivae normal.   Cardiovascular:      Rate and Rhythm: Normal rate and regular rhythm.      Pulses: Normal pulses.      Heart sounds: Normal heart sounds.   Pulmonary:      Breath sounds: Wheezing (Bilateral throughout all lung fields.) present.      Comments: Tachypnea.  Hypoxic, currently 92% on 5 L oxygen via nasal cannula.  Mild conversational dyspnea.  Abdominal:      General: Bowel sounds are normal.  "There is no distension.      Palpations: Abdomen is soft.      Tenderness: There is no abdominal tenderness.   Musculoskeletal:         General: No tenderness. Normal range of motion.      Right lower leg: No edema.      Left lower leg: No edema.   Skin:     General: Skin is warm and dry.   Neurological:      General: No focal deficit present.      Mental Status: She is alert and oriented to person, place, and time.   Psychiatric:         Mood and Affect: Mood normal.         Behavior: Behavior normal.         Last Recorded Vitals  Blood pressure 124/58, pulse 66, temperature 35.8 °C (96.4 °F), resp. rate 18, height 1.626 m (5' 4\"), weight 47.6 kg (105 lb), SpO2 91%.     Relevant Results        Results for orders placed or performed during the hospital encounter of 04/03/25 (from the past 24 hours)   CBC and Auto Differential   Result Value Ref Range     WBC 13.2 (H) 4.4 - 11.3 x10*3/uL     nRBC 0.0 0.0 - 0.0 /100 WBCs     RBC 4.90 4.00 - 5.20 x10*6/uL     Hemoglobin 14.8 12.0 - 16.0 g/dL     Hematocrit 47.4 (H) 36.0 - 46.0 %     MCV 97 80 - 100 fL     MCH 30.2 26.0 - 34.0 pg     MCHC 31.2 (L) 32.0 - 36.0 g/dL     RDW 14.4 11.5 - 14.5 %     Platelets 308 150 - 450 x10*3/uL     Neutrophils % 76.0 40.0 - 80.0 %     Immature Granulocytes %, Automated 0.4 0.0 - 0.9 %     Lymphocytes % 11.0 13.0 - 44.0 %     Monocytes % 8.5 2.0 - 10.0 %     Eosinophils % 3.0 0.0 - 6.0 %     Basophils % 1.1 0.0 - 2.0 %     Neutrophils Absolute 10.07 (H) 1.60 - 5.50 x10*3/uL     Immature Granulocytes Absolute, Automated 0.05 0.00 - 0.50 x10*3/uL     Lymphocytes Absolute 1.45 0.80 - 3.00 x10*3/uL     Monocytes Absolute 1.12 (H) 0.05 - 0.80 x10*3/uL     Eosinophils Absolute 0.39 0.00 - 0.40 x10*3/uL     Basophils Absolute 0.14 (H) 0.00 - 0.10 x10*3/uL   Troponin I, High Sensitivity   Result Value Ref Range     Troponin I, High Sensitivity 5 0 - 13 ng/L   B-Type Natriuretic Peptide   Result Value Ref Range      (H) 0 - 99 pg/mL "   Comprehensive Metabolic Panel   Result Value Ref Range     Glucose 129 (H) 74 - 99 mg/dL     Sodium 136 136 - 145 mmol/L     Potassium 4.0 3.5 - 5.3 mmol/L     Chloride 100 98 - 107 mmol/L     Bicarbonate 25 21 - 32 mmol/L     Anion Gap 15 10 - 20 mmol/L     Urea Nitrogen 19 6 - 23 mg/dL     Creatinine 0.68 0.50 - 1.05 mg/dL     eGFR 90 >60 mL/min/1.73m*2     Calcium 9.7 8.6 - 10.3 mg/dL     Albumin 4.2 3.4 - 5.0 g/dL     Alkaline Phosphatase 80 33 - 136 U/L     Total Protein 7.0 6.4 - 8.2 g/dL     AST 18 9 - 39 U/L     Bilirubin, Total 0.5 0.0 - 1.2 mg/dL     ALT 16 7 - 45 U/L   Blood Gas Venous Full Panel   Result Value Ref Range     POCT pH, Venous 7.38 7.33 - 7.43 pH     POCT pCO2, Venous 50 41 - 51 mm Hg     POCT pO2, Venous 56 (H) 35 - 45 mm Hg     POCT SO2, Venous 86 (H) 45 - 75 %     POCT Oxy Hemoglobin, Venous 84.1 (H) 45.0 - 75.0 %     POCT Hematocrit Calculated, Venous 46.0 36.0 - 46.0 %     POCT Sodium, Venous 131 (L) 136 - 145 mmol/L     POCT Potassium, Venous 6.1 (HH) 3.5 - 5.3 mmol/L     POCT Chloride, Venous 101 98 - 107 mmol/L     POCT Ionized Calicum, Venous 1.17 1.10 - 1.33 mmol/L     POCT Glucose, Venous 133 (H) 74 - 99 mg/dL     POCT Lactate, Venous 1.0 0.4 - 2.0 mmol/L     POCT Base Excess, Venous 3.3 (H) -2.0 - 3.0 mmol/L     POCT HCO3 Calculated, Venous 29.6 (H) 22.0 - 26.0 mmol/L     POCT Hemoglobin, Venous 15.2 12.0 - 16.0 g/dL     POCT Anion Gap, Venous 7.0 (L) 10.0 - 25.0 mmol/L     Patient Temperature 37.0 degrees Celsius     FiO2 36 %     Critical Called By JOSE MCCALLUM RRT       Critical Called To [facility]       Critical Call Time 1750       Critical Read Back Y     Magnesium   Result Value Ref Range     Magnesium 1.85 1.60 - 2.40 mg/dL   Influenza A, and B PCR   Result Value Ref Range     Flu A Result Not Detected Not Detected     Flu B Result Not Detected Not Detected   RSV PCR   Result Value Ref Range     RSV PCR Not Detected Not Detected   Sars-CoV-2 PCR   Result Value Ref  Range     Coronavirus 2019, PCR Not Detected Not Detected   ECG 12 Lead   Result Value Ref Range     Ventricular Rate 68 BPM     Atrial Rate 68 BPM     IA Interval 176 ms     QRS Duration 158 ms     QT Interval 440 ms     QTC Calculation(Bazett) 467 ms     P Axis 71 degrees     R Axis 114 degrees     T Axis 19 degrees     QRS Count 11 beats     Q Onset 222 ms     P Onset 134 ms     P Offset 195 ms     T Offset 442 ms     QTC Fredericia 459 ms   CBC   Result Value Ref Range     WBC 8.0 4.4 - 11.3 x10*3/uL     nRBC 0.0 0.0 - 0.0 /100 WBCs     RBC 4.50 4.00 - 5.20 x10*6/uL     Hemoglobin 13.4 12.0 - 16.0 g/dL     Hematocrit 43.1 36.0 - 46.0 %     MCV 96 80 - 100 fL     MCH 29.8 26.0 - 34.0 pg     MCHC 31.1 (L) 32.0 - 36.0 g/dL     RDW 14.2 11.5 - 14.5 %     Platelets 325 150 - 450 x10*3/uL   Basic metabolic panel   Result Value Ref Range     Glucose 147 (H) 74 - 99 mg/dL     Sodium 135 (L) 136 - 145 mmol/L     Potassium 4.5 3.5 - 5.3 mmol/L     Chloride 99 98 - 107 mmol/L     Bicarbonate 28 21 - 32 mmol/L     Anion Gap 13 10 - 20 mmol/L     Urea Nitrogen 24 (H) 6 - 23 mg/dL     Creatinine 0.84 0.50 - 1.05 mg/dL     eGFR 72 >60 mL/min/1.73m*2     Calcium 9.3 8.6 - 10.3 mg/dL   Transthoracic echo (TTE) complete   Result Value Ref Range     AV pk magdalena 1.32 m/s     AV mn grad 4 mmHg     LVOT diam 1.90 cm     MV E/A ratio 0.70       LA vol index A/L 10.7 ml/m2     Tricuspid annular plane systolic excursion 2.1 cm     LV EF 60 %     RV free wall pk S' 17.20 cm/s     LVIDd 3.70 cm     Aortic Valve Area by Continuity of VTI 1.73 cm2     Aortic Valve Area by Continuity of Peak Velocity 2.17 cm2     AV pk grad 7 mmHg     LV A4C EF 63.6        XR chest 1 view     Result Date: 4/3/2025  Interpreted By:  Hilario Mauro, STUDY: XR CHEST 1 VIEW;  4/3/2025 5:56 pm   INDICATION: Signs/Symptoms:Short of breath.     COMPARISON: None.   ACCESSION NUMBER(S): [id number]   ORDERING CLINICIAN: ANA LEBLANC   FINDINGS:          CARDIOMEDIASTINAL SILHOUETTE: Cardiomediastinal silhouette is normal in size and configuration.   LUNGS: The lungs are hyperinflated but clear. There is no consolidation or effusion there is no edema   ABDOMEN: No remarkable upper abdominal findings.   BONES: No acute osseous changes.        Hyperinflated lungs. No evidence of acute cardiopulmonary process.     MACRO: None   Signed by: Hilario Mauro 4/3/2025 6:02 PM Dictation workstation:   [id number]          Assessment/Plan     Assessment & Plan  COPD exacerbation (Multi)     Shortness of breath     Wheezing     Hypoxia     Tachypnea        Radha Logan is a 79 y.o. female presents to the ED with complaints of shortness of breath.  States her symptoms been ongoing for the past 4 days.  Reports shortness of breath worse with activity/movement and improves at rest. States she normally wears 3 L of home oxygen at baseline, however has increased it to 5 L over the past couple days (was recently put on home oxygen a month ago).  Says she has noticed herself wheezing quite a bit at home.  Also reports a nonproductive cough over the past couple days.  Per EMR review, patient with recent admission at the beginning of March for similar symptoms, advised to follow-up with pulmonology outpatient and also cardiothoracic surgery for stable aortic aneurysm.      #Suspect COPD exacerbation  #Shortness of breath  #Wheezing  #Hypoxia  #Tachypnea  #Mild leukocytosis, 13.2  Admit for observation with telemetry monitor  COVID, flu, RSV negative.  Chest x-ray shows hyperinflated lungs, no evidence of acute cardiopulmonary process.  No consolidation.  IV azithromycin x 3 days  Patient given 125 mg IV Solu-Medrol in the ED, continue with 40 mg every 8 hours  Continuous oxygen, currently on 5 L, titrate back to baseline of 3 L.  Breathing treatments, Tylenol, Zofran as needed  Atarax as needed for anxiety  Cardiac/diabetic diet  Q 4 vitals  CBC, BMP in a.m.  PT/OT for evaluation  and treatment  Patient states she has not smoked in the past month  --Has appointment with pulmonology scheduled for next Wednesday     #Mildly elevated BNP, 118  #Dyspnea on exertion  Echocardiogram ordered for further evaluation, defer cardiology consult to attending pending results     Continue home medications as appropriate once med rec is completed      Chronic conditions:  COPD, hypothyroidism, aneurysm of descending thoracic aorta, chronic sinusitis, anxiety, history of NSTEMI, HLD, HTN, RBBB, hearing loss, tobacco use disorder, prediabetes     #DVT prophylaxis  Lovenox daily  SCDs, ambulation                  Fully evaluated on April 5.  Continue aggressive pulmonary hygiene.  Still with significant shortness of breath.  Recheck labs in AM.              Delia Núñez

## 2025-04-05 NOTE — CARE PLAN
The patient's goals for the shift include Better breathing    The clinical goals for the shift include patient's breathing will improve by end of shift.

## 2025-04-05 NOTE — CARE PLAN
The patient's goals for the shift include Better breathing    The clinical goals for the shift include Pt. will have a decrease in pain by end of shift    Over the shift, the patient did make progress toward the following goals.

## 2025-04-06 VITALS
HEIGHT: 64 IN | HEART RATE: 68 BPM | DIASTOLIC BLOOD PRESSURE: 70 MMHG | WEIGHT: 104.06 LBS | BODY MASS INDEX: 17.77 KG/M2 | RESPIRATION RATE: 18 BRPM | SYSTOLIC BLOOD PRESSURE: 162 MMHG | TEMPERATURE: 96.1 F | OXYGEN SATURATION: 94 %

## 2025-04-06 LAB
ALBUMIN SERPL BCP-MCNC: 3.7 G/DL (ref 3.4–5)
ALP SERPL-CCNC: 59 U/L (ref 33–136)
ALT SERPL W P-5'-P-CCNC: 14 U/L (ref 7–45)
ANION GAP SERPL CALC-SCNC: 13 MMOL/L (ref 10–20)
AST SERPL W P-5'-P-CCNC: 11 U/L (ref 9–39)
BILIRUB SERPL-MCNC: 0.3 MG/DL (ref 0–1.2)
BUN SERPL-MCNC: 24 MG/DL (ref 6–23)
CALCIUM SERPL-MCNC: 9.5 MG/DL (ref 8.6–10.3)
CHLORIDE SERPL-SCNC: 102 MMOL/L (ref 98–107)
CO2 SERPL-SCNC: 31 MMOL/L (ref 21–32)
CREAT SERPL-MCNC: 0.79 MG/DL (ref 0.5–1.05)
EGFRCR SERPLBLD CKD-EPI 2021: 77 ML/MIN/1.73M*2
ERYTHROCYTE [DISTWIDTH] IN BLOOD BY AUTOMATED COUNT: 15 % (ref 11.5–14.5)
GLUCOSE SERPL-MCNC: 147 MG/DL (ref 74–99)
HCT VFR BLD AUTO: 42.3 % (ref 36–46)
HGB BLD-MCNC: 13 G/DL (ref 12–16)
MCH RBC QN AUTO: 30 PG (ref 26–34)
MCHC RBC AUTO-ENTMCNC: 30.7 G/DL (ref 32–36)
MCV RBC AUTO: 98 FL (ref 80–100)
NRBC BLD-RTO: 0 /100 WBCS (ref 0–0)
PLATELET # BLD AUTO: 360 X10*3/UL (ref 150–450)
POTASSIUM SERPL-SCNC: 5.1 MMOL/L (ref 3.5–5.3)
PROT SERPL-MCNC: 6.1 G/DL (ref 6.4–8.2)
RBC # BLD AUTO: 4.34 X10*6/UL (ref 4–5.2)
SODIUM SERPL-SCNC: 141 MMOL/L (ref 136–145)
WBC # BLD AUTO: 23.1 X10*3/UL (ref 4.4–11.3)

## 2025-04-06 PROCEDURE — 2500000004 HC RX 250 GENERAL PHARMACY W/ HCPCS (ALT 636 FOR OP/ED): Performed by: PHYSICIAN ASSISTANT

## 2025-04-06 PROCEDURE — 80053 COMPREHEN METABOLIC PANEL: CPT | Performed by: INTERNAL MEDICINE

## 2025-04-06 PROCEDURE — 94640 AIRWAY INHALATION TREATMENT: CPT

## 2025-04-06 PROCEDURE — 2500000002 HC RX 250 W HCPCS SELF ADMINISTERED DRUGS (ALT 637 FOR MEDICARE OP, ALT 636 FOR OP/ED): Performed by: INTERNAL MEDICINE

## 2025-04-06 PROCEDURE — 2500000005 HC RX 250 GENERAL PHARMACY W/O HCPCS: Performed by: PHYSICIAN ASSISTANT

## 2025-04-06 PROCEDURE — 1200000002 HC GENERAL ROOM WITH TELEMETRY DAILY

## 2025-04-06 PROCEDURE — 36415 COLL VENOUS BLD VENIPUNCTURE: CPT | Performed by: INTERNAL MEDICINE

## 2025-04-06 PROCEDURE — 85027 COMPLETE CBC AUTOMATED: CPT | Performed by: INTERNAL MEDICINE

## 2025-04-06 PROCEDURE — 2500000004 HC RX 250 GENERAL PHARMACY W/ HCPCS (ALT 636 FOR OP/ED): Performed by: INTERNAL MEDICINE

## 2025-04-06 PROCEDURE — 2500000001 HC RX 250 WO HCPCS SELF ADMINISTERED DRUGS (ALT 637 FOR MEDICARE OP): Performed by: PHYSICIAN ASSISTANT

## 2025-04-06 RX ORDER — IPRATROPIUM BROMIDE AND ALBUTEROL SULFATE 2.5; .5 MG/3ML; MG/3ML
3 SOLUTION RESPIRATORY (INHALATION)
Status: ACTIVE | OUTPATIENT
Start: 2025-04-07

## 2025-04-06 RX ADMIN — Medication 3 L/MIN: at 07:03

## 2025-04-06 RX ADMIN — METHYLPREDNISOLONE SODIUM SUCCINATE 40 MG: 40 INJECTION, POWDER, FOR SOLUTION INTRAMUSCULAR; INTRAVENOUS at 21:55

## 2025-04-06 RX ADMIN — IPRATROPIUM BROMIDE AND ALBUTEROL SULFATE 3 ML: .5; 3 SOLUTION RESPIRATORY (INHALATION) at 01:52

## 2025-04-06 RX ADMIN — ATORVASTATIN CALCIUM 80 MG: 80 TABLET, FILM COATED ORAL at 09:24

## 2025-04-06 RX ADMIN — METOPROLOL SUCCINATE 50 MG: 50 TABLET, EXTENDED RELEASE ORAL at 09:24

## 2025-04-06 RX ADMIN — TRAZODONE HYDROCHLORIDE 100 MG: 50 TABLET ORAL at 20:15

## 2025-04-06 RX ADMIN — GUAIFENESIN 1200 MG: 600 TABLET, EXTENDED RELEASE ORAL at 09:23

## 2025-04-06 RX ADMIN — METHYLPREDNISOLONE SODIUM SUCCINATE 40 MG: 40 INJECTION, POWDER, FOR SOLUTION INTRAMUSCULAR; INTRAVENOUS at 15:22

## 2025-04-06 RX ADMIN — GABAPENTIN 300 MG: 300 CAPSULE ORAL at 20:14

## 2025-04-06 RX ADMIN — ENOXAPARIN SODIUM 40 MG: 40 INJECTION SUBCUTANEOUS at 20:13

## 2025-04-06 RX ADMIN — IPRATROPIUM BROMIDE AND ALBUTEROL SULFATE 3 ML: .5; 3 SOLUTION RESPIRATORY (INHALATION) at 07:03

## 2025-04-06 RX ADMIN — METHYLPREDNISOLONE SODIUM SUCCINATE 40 MG: 40 INJECTION, POWDER, FOR SOLUTION INTRAMUSCULAR; INTRAVENOUS at 06:02

## 2025-04-06 RX ADMIN — TRAMADOL HYDROCHLORIDE 50 MG: 50 TABLET, COATED ORAL at 11:27

## 2025-04-06 RX ADMIN — IPRATROPIUM BROMIDE AND ALBUTEROL SULFATE 3 ML: .5; 3 SOLUTION RESPIRATORY (INHALATION) at 13:08

## 2025-04-06 RX ADMIN — LISINOPRIL 40 MG: 40 TABLET ORAL at 09:24

## 2025-04-06 RX ADMIN — ESCITALOPRAM OXALATE 10 MG: 10 TABLET ORAL at 09:23

## 2025-04-06 RX ADMIN — GUAIFENESIN 1200 MG: 600 TABLET, EXTENDED RELEASE ORAL at 20:14

## 2025-04-06 RX ADMIN — GABAPENTIN 300 MG: 300 CAPSULE ORAL at 09:23

## 2025-04-06 RX ADMIN — ASPIRIN 81 MG: 81 TABLET, COATED ORAL at 09:23

## 2025-04-06 RX ADMIN — IPRATROPIUM BROMIDE AND ALBUTEROL SULFATE 3 ML: .5; 3 SOLUTION RESPIRATORY (INHALATION) at 19:25

## 2025-04-06 RX ADMIN — AMLODIPINE BESYLATE 5 MG: 5 TABLET ORAL at 09:24

## 2025-04-06 ASSESSMENT — COGNITIVE AND FUNCTIONAL STATUS - GENERAL
TOILETING: A LITTLE
HELP NEEDED FOR BATHING: A LITTLE
STANDING UP FROM CHAIR USING ARMS: A LITTLE
PERSONAL GROOMING: A LITTLE
HELP NEEDED FOR BATHING: A LITTLE
DRESSING REGULAR LOWER BODY CLOTHING: A LITTLE
WALKING IN HOSPITAL ROOM: A LITTLE
TOILETING: A LITTLE
DAILY ACTIVITIY SCORE: 19
PERSONAL GROOMING: A LITTLE
MOVING TO AND FROM BED TO CHAIR: A LITTLE
STANDING UP FROM CHAIR USING ARMS: A LITTLE
DRESSING REGULAR UPPER BODY CLOTHING: A LITTLE
MOBILITY SCORE: 21
DAILY ACTIVITIY SCORE: 19
DRESSING REGULAR LOWER BODY CLOTHING: A LITTLE
DRESSING REGULAR UPPER BODY CLOTHING: A LITTLE
MOBILITY SCORE: 21
MOVING TO AND FROM BED TO CHAIR: A LITTLE
WALKING IN HOSPITAL ROOM: A LITTLE

## 2025-04-06 ASSESSMENT — PAIN - FUNCTIONAL ASSESSMENT
PAIN_FUNCTIONAL_ASSESSMENT: 0-10
PAIN_FUNCTIONAL_ASSESSMENT: 0-10

## 2025-04-06 ASSESSMENT — PAIN SCALES - GENERAL
PAINLEVEL_OUTOF10: 0 - NO PAIN
PAINLEVEL_OUTOF10: 0 - NO PAIN
PAINLEVEL_OUTOF10: 4
PAINLEVEL_OUTOF10: 5 - MODERATE PAIN

## 2025-04-06 NOTE — CARE PLAN
The patient's goals for the shift include Better breathing    The clinical goals for the shift include Pain Management    Problem: Pain - Adult  Goal: Verbalizes/displays adequate comfort level or baseline comfort level  Outcome: Progressing     Problem: Safety - Adult  Goal: Free from fall injury  Outcome: Progressing     Problem: Discharge Planning  Goal: Discharge to home or other facility with appropriate resources  Outcome: Progressing     Problem: Chronic Conditions and Co-morbidities  Goal: Patient's chronic conditions and co-morbidity symptoms are monitored and maintained or improved  Outcome: Progressing     Problem: Nutrition  Goal: Nutrient intake appropriate for maintaining nutritional needs  Outcome: Progressing

## 2025-04-06 NOTE — CARE PLAN
The patient's goals for the shift include Better breathing    The clinical goals for the shift include pt. will remain safe and comfortable during shift

## 2025-04-06 NOTE — PROGRESS NOTES
Radha Logan is a 79 y.o. female on day 1 of admission presenting with COPD exacerbation (Multi).      Subjective   No events overnight. Patient seen and examined at bedside. She is still feeling short of breath with significant dyspnea on exertion.        Objective     Last Recorded Vitals  /78 (Patient Position: Sitting)   Pulse 66   Temp 35.9 °C (96.6 °F) (Temporal)   Resp 18   Wt 47.2 kg (104 lb 0.9 oz)   SpO2 94%   Intake/Output last 3 Shifts:  No intake or output data in the 24 hours ending 04/06/25 1445    Admission Weight  Weight: 47.6 kg (105 lb) (04/03/25 2202)    Daily Weight  04/06/25 : 47.2 kg (104 lb 0.9 oz)    Image Results      Physical Exam  Constitutional:       General: She is not in acute distress.     Appearance: Normal appearance. She is not toxic-appearing.   HENT:      Head: Normocephalic and atraumatic.      Mouth/Throat:      Mouth: Mucous membranes are moist.      Pharynx: Oropharynx is clear.   Eyes:      Conjunctiva/sclera: Conjunctivae normal.   Cardiovascular:      Rate and Rhythm: Normal rate and regular rhythm.      Pulses: Normal pulses.      Heart sounds: Normal heart sounds.   Pulmonary:      Breath sounds: Wheezing (Bilateral throughout all lung fields.) present.      Comments: Tachypnea.  Hypoxic, currently 92% on 5 L oxygen via nasal cannula.  Mild conversational dyspnea.  Abdominal:      General: Bowel sounds are normal. There is no distension.      Palpations: Abdomen is soft.      Tenderness: There is no abdominal tenderness.   Musculoskeletal:         General: No tenderness. Normal range of motion.      Right lower leg: No edema.      Left lower leg: No edema.   Skin:     General: Skin is warm and dry.   Neurological:      General: No focal deficit present.      Mental Status: She is alert and oriented to person, place, and time.   Psychiatric:         Mood and Affect: Mood normal.         Behavior: Behavior normal.      Relevant Results                Assessment/Plan                  Assessment & Plan  COPD exacerbation (Multi)    Shortness of breath    Wheezing    Hypoxia    Tachypnea    Radha Logan is a 79 y.o. female presents to the ED with complaints of shortness of breath.  States her symptoms been ongoing for the past 4 days.  Reports shortness of breath worse with activity/movement and improves at rest. States she normally wears 3 L of home oxygen at baseline, however has increased it to 5 L over the past couple days (was recently put on home oxygen a month ago).  Says she has noticed herself wheezing quite a bit at home.  Also reports a nonproductive cough over the past couple days.  Per EMR review, patient with recent admission at the beginning of March for similar symptoms, advised to follow-up with pulmonology outpatient and also cardiothoracic surgery for stable aortic aneurysm.      #Suspect COPD exacerbation  #Shortness of breath  #Wheezing  #Hypoxia  #Tachypnea  #Mild leukocytosis, 13.2  Admit for observation with telemetry monitor  COVID, flu, RSV negative.  Chest x-ray shows hyperinflated lungs, no evidence of acute cardiopulmonary process.  No consolidation.  IV azithromycin x 3 days  Patient given 125 mg IV Solu-Medrol in the ED, continue with 40 mg every 8 hours  Continuous oxygen, currently on 5 L, titrate back to baseline of 3 L.  Breathing treatments, Tylenol, Zofran as needed  Atarax as needed for anxiety  Cardiac/diabetic diet  Q 4 vitals  CBC, BMP in a.m.  PT/OT for evaluation and treatment  Patient states she has not smoked in the past month  --Has appointment with pulmonology scheduled for next Wednesday     #Mildly elevated BNP, 118  #Dyspnea on exertion  Echocardiogram ordered for further evaluation, defer cardiology consult to attending pending results     Continue home medications as appropriate once med rec is completed      Chronic conditions:  COPD, hypothyroidism, aneurysm of descending thoracic aorta, chronic sinusitis,  anxiety, history of NSTEMI, HLD, HTN, RBBB, hearing loss, tobacco use disorder, prediabetes     #DVT prophylaxis  Lovenox daily  SCDs, ambulation     4/6: Patient remains symptomatic. She is on 3L nasal cannula. She wears oxygen as needed at home. Leukocytosis improving from 24 down to 23. Continue current medications. Reassess tomorrow.               Trev Saldivar, DO

## 2025-04-07 LAB
ANION GAP SERPL CALC-SCNC: 8 MMOL/L (ref 10–20)
BUN SERPL-MCNC: 19 MG/DL (ref 6–23)
CALCIUM SERPL-MCNC: 9.6 MG/DL (ref 8.6–10.3)
CHLORIDE SERPL-SCNC: 100 MMOL/L (ref 98–107)
CO2 SERPL-SCNC: 34 MMOL/L (ref 21–32)
CREAT SERPL-MCNC: 0.65 MG/DL (ref 0.5–1.05)
EGFRCR SERPLBLD CKD-EPI 2021: >90 ML/MIN/1.73M*2
ERYTHROCYTE [DISTWIDTH] IN BLOOD BY AUTOMATED COUNT: 15 % (ref 11.5–14.5)
GLUCOSE SERPL-MCNC: 145 MG/DL (ref 74–99)
HCT VFR BLD AUTO: 43.7 % (ref 36–46)
HGB BLD-MCNC: 14.4 G/DL (ref 12–16)
MCH RBC QN AUTO: 30.8 PG (ref 26–34)
MCHC RBC AUTO-ENTMCNC: 33 G/DL (ref 32–36)
MCV RBC AUTO: 94 FL (ref 80–100)
NRBC BLD-RTO: 0 /100 WBCS (ref 0–0)
PLATELET # BLD AUTO: 370 X10*3/UL (ref 150–450)
POTASSIUM SERPL-SCNC: 4.4 MMOL/L (ref 3.5–5.3)
RBC # BLD AUTO: 4.67 X10*6/UL (ref 4–5.2)
SODIUM SERPL-SCNC: 138 MMOL/L (ref 136–145)
WBC # BLD AUTO: 17.1 X10*3/UL (ref 4.4–11.3)

## 2025-04-07 PROCEDURE — 2500000005 HC RX 250 GENERAL PHARMACY W/O HCPCS: Performed by: PHYSICIAN ASSISTANT

## 2025-04-07 PROCEDURE — 80048 BASIC METABOLIC PNL TOTAL CA: CPT | Performed by: INTERNAL MEDICINE

## 2025-04-07 PROCEDURE — 2500000005 HC RX 250 GENERAL PHARMACY W/O HCPCS: Performed by: INTERNAL MEDICINE

## 2025-04-07 PROCEDURE — 2500000004 HC RX 250 GENERAL PHARMACY W/ HCPCS (ALT 636 FOR OP/ED): Performed by: INTERNAL MEDICINE

## 2025-04-07 PROCEDURE — 2500000001 HC RX 250 WO HCPCS SELF ADMINISTERED DRUGS (ALT 637 FOR MEDICARE OP): Performed by: INTERNAL MEDICINE

## 2025-04-07 PROCEDURE — 2500000002 HC RX 250 W HCPCS SELF ADMINISTERED DRUGS (ALT 637 FOR MEDICARE OP, ALT 636 FOR OP/ED): Performed by: EMERGENCY MEDICINE

## 2025-04-07 PROCEDURE — 36415 COLL VENOUS BLD VENIPUNCTURE: CPT | Performed by: INTERNAL MEDICINE

## 2025-04-07 PROCEDURE — 1100000001 HC PRIVATE ROOM DAILY

## 2025-04-07 PROCEDURE — 2500000002 HC RX 250 W HCPCS SELF ADMINISTERED DRUGS (ALT 637 FOR MEDICARE OP, ALT 636 FOR OP/ED): Performed by: INTERNAL MEDICINE

## 2025-04-07 PROCEDURE — 85027 COMPLETE CBC AUTOMATED: CPT | Performed by: INTERNAL MEDICINE

## 2025-04-07 PROCEDURE — 2500000001 HC RX 250 WO HCPCS SELF ADMINISTERED DRUGS (ALT 637 FOR MEDICARE OP): Performed by: PHYSICIAN ASSISTANT

## 2025-04-07 PROCEDURE — 94640 AIRWAY INHALATION TREATMENT: CPT

## 2025-04-07 PROCEDURE — 2500000004 HC RX 250 GENERAL PHARMACY W/ HCPCS (ALT 636 FOR OP/ED): Mod: JZ | Performed by: PHYSICIAN ASSISTANT

## 2025-04-07 RX ORDER — AMLODIPINE BESYLATE 5 MG/1
5 TABLET ORAL ONCE
Status: COMPLETED | OUTPATIENT
Start: 2025-04-07 | End: 2025-04-07

## 2025-04-07 RX ORDER — AMLODIPINE BESYLATE 10 MG/1
10 TABLET ORAL DAILY
Status: DISCONTINUED | OUTPATIENT
Start: 2025-04-08 | End: 2025-04-08 | Stop reason: HOSPADM

## 2025-04-07 RX ORDER — HYDRALAZINE HYDROCHLORIDE 50 MG/1
50 TABLET, FILM COATED ORAL 2 TIMES DAILY
Status: DISCONTINUED | OUTPATIENT
Start: 2025-04-07 | End: 2025-04-08 | Stop reason: HOSPADM

## 2025-04-07 RX ADMIN — HYDRALAZINE HYDROCHLORIDE 50 MG: 50 TABLET ORAL at 11:26

## 2025-04-07 RX ADMIN — HYDROXYZINE HYDROCHLORIDE 25 MG: 25 TABLET ORAL at 04:54

## 2025-04-07 RX ADMIN — TRAZODONE HYDROCHLORIDE 100 MG: 50 TABLET ORAL at 20:33

## 2025-04-07 RX ADMIN — ATORVASTATIN CALCIUM 80 MG: 80 TABLET, FILM COATED ORAL at 08:13

## 2025-04-07 RX ADMIN — GABAPENTIN 300 MG: 300 CAPSULE ORAL at 20:34

## 2025-04-07 RX ADMIN — METHYLPREDNISOLONE SODIUM SUCCINATE 40 MG: 40 INJECTION, POWDER, FOR SOLUTION INTRAMUSCULAR; INTRAVENOUS at 05:53

## 2025-04-07 RX ADMIN — Medication 5 L/MIN: at 06:41

## 2025-04-07 RX ADMIN — TRAMADOL HYDROCHLORIDE 50 MG: 50 TABLET, COATED ORAL at 08:16

## 2025-04-07 RX ADMIN — TRAMADOL HYDROCHLORIDE 50 MG: 50 TABLET, COATED ORAL at 20:37

## 2025-04-07 RX ADMIN — IPRATROPIUM BROMIDE AND ALBUTEROL SULFATE 3 ML: .5; 3 SOLUTION RESPIRATORY (INHALATION) at 19:40

## 2025-04-07 RX ADMIN — LISINOPRIL 40 MG: 40 TABLET ORAL at 08:14

## 2025-04-07 RX ADMIN — ASPIRIN 81 MG: 81 TABLET, COATED ORAL at 08:13

## 2025-04-07 RX ADMIN — IPRATROPIUM BROMIDE AND ALBUTEROL SULFATE 3 ML: .5; 3 SOLUTION RESPIRATORY (INHALATION) at 04:58

## 2025-04-07 RX ADMIN — GUAIFENESIN 1200 MG: 600 TABLET, EXTENDED RELEASE ORAL at 08:13

## 2025-04-07 RX ADMIN — ESCITALOPRAM OXALATE 10 MG: 10 TABLET ORAL at 08:13

## 2025-04-07 RX ADMIN — AMLODIPINE BESYLATE 5 MG: 5 TABLET ORAL at 08:14

## 2025-04-07 RX ADMIN — METOPROLOL SUCCINATE 50 MG: 50 TABLET, EXTENDED RELEASE ORAL at 08:13

## 2025-04-07 RX ADMIN — Medication 3 L/MIN: at 08:16

## 2025-04-07 RX ADMIN — HYDROXYZINE HYDROCHLORIDE 25 MG: 25 TABLET ORAL at 14:21

## 2025-04-07 RX ADMIN — IPRATROPIUM BROMIDE AND ALBUTEROL SULFATE 3 ML: .5; 3 SOLUTION RESPIRATORY (INHALATION) at 06:41

## 2025-04-07 RX ADMIN — HYDRALAZINE HYDROCHLORIDE 50 MG: 50 TABLET ORAL at 20:34

## 2025-04-07 RX ADMIN — TRAMADOL HYDROCHLORIDE 50 MG: 50 TABLET, COATED ORAL at 14:21

## 2025-04-07 RX ADMIN — GABAPENTIN 300 MG: 300 CAPSULE ORAL at 08:13

## 2025-04-07 RX ADMIN — Medication 44 PERCENT: at 19:40

## 2025-04-07 RX ADMIN — AMLODIPINE BESYLATE 5 MG: 5 TABLET ORAL at 11:25

## 2025-04-07 RX ADMIN — IPRATROPIUM BROMIDE AND ALBUTEROL SULFATE 3 ML: .5; 3 SOLUTION RESPIRATORY (INHALATION) at 12:33

## 2025-04-07 RX ADMIN — PSYLLIUM HUSK 1 PACKET: 3.4 POWDER ORAL at 08:16

## 2025-04-07 RX ADMIN — ENOXAPARIN SODIUM 40 MG: 40 INJECTION SUBCUTANEOUS at 20:33

## 2025-04-07 RX ADMIN — GUAIFENESIN 1200 MG: 600 TABLET, EXTENDED RELEASE ORAL at 20:33

## 2025-04-07 ASSESSMENT — COGNITIVE AND FUNCTIONAL STATUS - GENERAL
STANDING UP FROM CHAIR USING ARMS: A LITTLE
HELP NEEDED FOR BATHING: A LITTLE
CLIMB 3 TO 5 STEPS WITH RAILING: A LITTLE
TOILETING: A LITTLE
WALKING IN HOSPITAL ROOM: A LITTLE
MOVING TO AND FROM BED TO CHAIR: A LITTLE
DRESSING REGULAR LOWER BODY CLOTHING: A LITTLE
PERSONAL GROOMING: A LITTLE
MOBILITY SCORE: 20
DAILY ACTIVITIY SCORE: 20

## 2025-04-07 ASSESSMENT — PAIN DESCRIPTION - DESCRIPTORS
DESCRIPTORS: ACHING;SORE
DESCRIPTORS: SORE

## 2025-04-07 ASSESSMENT — PAIN SCALES - GENERAL
PAINLEVEL_OUTOF10: 6
PAINLEVEL_OUTOF10: 6
PAINLEVEL_OUTOF10: 4
PAINLEVEL_OUTOF10: 0 - NO PAIN

## 2025-04-07 ASSESSMENT — PAIN - FUNCTIONAL ASSESSMENT
PAIN_FUNCTIONAL_ASSESSMENT: 0-10

## 2025-04-07 NOTE — CARE PLAN
The patient's goals for the shift include Better breathing    The clinical goals for the shift include pt to remain safe    Problem: Pain - Adult  Goal: Verbalizes/displays adequate comfort level or baseline comfort level  Outcome: Progressing     Problem: Safety - Adult  Goal: Free from fall injury  Outcome: Progressing     Problem: Discharge Planning  Goal: Discharge to home or other facility with appropriate resources  Outcome: Progressing     Problem: Chronic Conditions and Co-morbidities  Goal: Patient's chronic conditions and co-morbidity symptoms are monitored and maintained or improved  Outcome: Progressing     Problem: Nutrition  Goal: Nutrient intake appropriate for maintaining nutritional needs  Outcome: Progressing

## 2025-04-07 NOTE — PROGRESS NOTES
Radha Logan is a 79 y.o. female on day 2 of admission presenting with COPD exacerbation (Multi).      Subjective   No events overnight. Patient seen and examined at bedside. She is on 5L NC. She is still profusely wheezing. She requests to be able to shower.        Objective     Last Recorded Vitals  BP (!) 183/79   Pulse 58   Temp 36.7 °C (98.1 °F)   Resp 18   Wt 47.2 kg (104 lb 0.9 oz)   SpO2 92%   Intake/Output last 3 Shifts:    Intake/Output Summary (Last 24 hours) at 4/7/2025 1335  Last data filed at 4/7/2025 0604  Gross per 24 hour   Intake 590 ml   Output --   Net 590 ml       Admission Weight  Weight: 47.6 kg (105 lb) (04/03/25 2202)    Daily Weight  04/06/25 : 47.2 kg (104 lb 0.9 oz)    Image Results      Physical Exam  Constitutional:       General: She is not in acute distress.     Appearance: Normal appearance. She is not toxic-appearing.   HENT:      Head: Normocephalic and atraumatic.      Mouth/Throat:      Mouth: Mucous membranes are moist.      Pharynx: Oropharynx is clear.   Eyes:      Conjunctiva/sclera: Conjunctivae normal.   Cardiovascular:      Rate and Rhythm: Normal rate and regular rhythm.      Pulses: Normal pulses.      Heart sounds: Normal heart sounds.   Pulmonary:      Breath sounds: Wheezing (Bilateral throughout all lung fields.) present.      Comments: Tachypnea.  Hypoxic, currently 92% on 5 L oxygen via nasal cannula.  Mild conversational dyspnea.  Abdominal:      General: Bowel sounds are normal. There is no distension.      Palpations: Abdomen is soft.      Tenderness: There is no abdominal tenderness.   Musculoskeletal:         General: No tenderness. Normal range of motion.      Right lower leg: No edema.      Left lower leg: No edema.   Skin:     General: Skin is warm and dry.   Neurological:      General: No focal deficit present.      Mental Status: She is alert and oriented to person, place, and time.   Psychiatric:         Mood and Affect: Mood normal.          Behavior: Behavior normal.      Relevant Results               Assessment/Plan                  Assessment & Plan  COPD exacerbation (Multi)    Shortness of breath    Wheezing    Hypoxia    Tachypnea    Radha Logan is a 79 y.o. female presents to the ED with complaints of shortness of breath.  States her symptoms been ongoing for the past 4 days.  Reports shortness of breath worse with activity/movement and improves at rest. States she normally wears 3 L of home oxygen at baseline, however has increased it to 5 L over the past couple days (was recently put on home oxygen a month ago).  Says she has noticed herself wheezing quite a bit at home.  Also reports a nonproductive cough over the past couple days.  Per EMR review, patient with recent admission at the beginning of March for similar symptoms, advised to follow-up with pulmonology outpatient and also cardiothoracic surgery for stable aortic aneurysm.      #Suspect COPD exacerbation  #Shortness of breath  #Wheezing  #Hypoxia  #Tachypnea  #Mild leukocytosis, 13.2  Admit for observation with telemetry monitor  COVID, flu, RSV negative.  Chest x-ray shows hyperinflated lungs, no evidence of acute cardiopulmonary process.  No consolidation.  IV azithromycin x 3 days  Patient given 125 mg IV Solu-Medrol in the ED, continue with 40 mg every 8 hours  Continuous oxygen, currently on 5 L, titrate back to baseline of 3 L.  Breathing treatments, Tylenol, Zofran as needed  Atarax as needed for anxiety  Cardiac/diabetic diet  Q 4 vitals  CBC, BMP in a.m.  PT/OT for evaluation and treatment  Patient states she has not smoked in the past month  --Has appointment with pulmonology scheduled for next Wednesday     #Mildly elevated BNP, 118  #Dyspnea on exertion  Echocardiogram ordered for further evaluation, defer cardiology consult to attending pending results     Continue home medications as appropriate once med rec is completed      Chronic conditions:  COPD,  hypothyroidism, aneurysm of descending thoracic aorta, chronic sinusitis, anxiety, history of NSTEMI, HLD, HTN, RBBB, hearing loss, tobacco use disorder, prediabetes     #DVT prophylaxis  Lovenox daily  SCDs, ambulation     4/6: Patient remains symptomatic. She is on 3L nasal cannula. She wears oxygen as needed at home. Leukocytosis improving from 24 down to 23. Continue current medications. Reassess tomorrow.     4/7: Leukocytosis improved from 23 down to 17. Continue current management. Decreased solumedrol to once daily. Patient is hypertensive. Increase amlodipine and add hydralazine. Continue to wean O2. Ideally patient gets to 3L or less before discharge as her home oxygen only goes to 5L.              Trev Saldivar, DO

## 2025-04-08 VITALS
WEIGHT: 103.62 LBS | TEMPERATURE: 97.7 F | OXYGEN SATURATION: 91 % | HEIGHT: 64 IN | BODY MASS INDEX: 17.69 KG/M2 | HEART RATE: 55 BPM | DIASTOLIC BLOOD PRESSURE: 63 MMHG | RESPIRATION RATE: 18 BRPM | SYSTOLIC BLOOD PRESSURE: 135 MMHG

## 2025-04-08 LAB
ANION GAP SERPL CALC-SCNC: 8 MMOL/L (ref 10–20)
BUN SERPL-MCNC: 21 MG/DL (ref 6–23)
CALCIUM SERPL-MCNC: 9.1 MG/DL (ref 8.6–10.3)
CHLORIDE SERPL-SCNC: 100 MMOL/L (ref 98–107)
CO2 SERPL-SCNC: 35 MMOL/L (ref 21–32)
CREAT SERPL-MCNC: 0.79 MG/DL (ref 0.5–1.05)
EGFRCR SERPLBLD CKD-EPI 2021: 77 ML/MIN/1.73M*2
ERYTHROCYTE [DISTWIDTH] IN BLOOD BY AUTOMATED COUNT: 15 % (ref 11.5–14.5)
GLUCOSE SERPL-MCNC: 90 MG/DL (ref 74–99)
HCT VFR BLD AUTO: 43.3 % (ref 36–46)
HGB BLD-MCNC: 13.6 G/DL (ref 12–16)
MCH RBC QN AUTO: 30.2 PG (ref 26–34)
MCHC RBC AUTO-ENTMCNC: 31.4 G/DL (ref 32–36)
MCV RBC AUTO: 96 FL (ref 80–100)
NRBC BLD-RTO: 0 /100 WBCS (ref 0–0)
PLATELET # BLD AUTO: 361 X10*3/UL (ref 150–450)
POTASSIUM SERPL-SCNC: 3.9 MMOL/L (ref 3.5–5.3)
RBC # BLD AUTO: 4.51 X10*6/UL (ref 4–5.2)
SODIUM SERPL-SCNC: 139 MMOL/L (ref 136–145)
WBC # BLD AUTO: 13.2 X10*3/UL (ref 4.4–11.3)

## 2025-04-08 PROCEDURE — 2500000001 HC RX 250 WO HCPCS SELF ADMINISTERED DRUGS (ALT 637 FOR MEDICARE OP): Performed by: INTERNAL MEDICINE

## 2025-04-08 PROCEDURE — 2500000004 HC RX 250 GENERAL PHARMACY W/ HCPCS (ALT 636 FOR OP/ED): Performed by: INTERNAL MEDICINE

## 2025-04-08 PROCEDURE — 2500000002 HC RX 250 W HCPCS SELF ADMINISTERED DRUGS (ALT 637 FOR MEDICARE OP, ALT 636 FOR OP/ED): Performed by: INTERNAL MEDICINE

## 2025-04-08 PROCEDURE — 36415 COLL VENOUS BLD VENIPUNCTURE: CPT | Performed by: INTERNAL MEDICINE

## 2025-04-08 PROCEDURE — 85027 COMPLETE CBC AUTOMATED: CPT | Performed by: INTERNAL MEDICINE

## 2025-04-08 PROCEDURE — 94640 AIRWAY INHALATION TREATMENT: CPT

## 2025-04-08 PROCEDURE — 80048 BASIC METABOLIC PNL TOTAL CA: CPT | Performed by: INTERNAL MEDICINE

## 2025-04-08 PROCEDURE — 2500000001 HC RX 250 WO HCPCS SELF ADMINISTERED DRUGS (ALT 637 FOR MEDICARE OP): Performed by: PHYSICIAN ASSISTANT

## 2025-04-08 PROCEDURE — 2500000005 HC RX 250 GENERAL PHARMACY W/O HCPCS: Performed by: INTERNAL MEDICINE

## 2025-04-08 RX ORDER — AMLODIPINE BESYLATE 10 MG/1
10 TABLET ORAL DAILY
Qty: 30 TABLET | Refills: 0 | Status: SHIPPED | OUTPATIENT
Start: 2025-04-09

## 2025-04-08 RX ORDER — BENZONATATE 200 MG/1
200 CAPSULE ORAL 3 TIMES DAILY PRN
Qty: 20 CAPSULE | Refills: 0 | Status: SHIPPED | OUTPATIENT
Start: 2025-04-08

## 2025-04-08 RX ORDER — HYDROXYZINE HYDROCHLORIDE 25 MG/1
25 TABLET, FILM COATED ORAL EVERY 8 HOURS PRN
Qty: 30 TABLET | Refills: 0 | Status: SHIPPED | OUTPATIENT
Start: 2025-04-08

## 2025-04-08 RX ORDER — ONDANSETRON 4 MG/1
4 TABLET, FILM COATED ORAL EVERY 8 HOURS PRN
Qty: 20 TABLET | Refills: 0 | Status: SHIPPED | OUTPATIENT
Start: 2025-04-08

## 2025-04-08 RX ORDER — GUAIFENESIN 1200 MG/1
1200 TABLET, EXTENDED RELEASE ORAL 2 TIMES DAILY
Qty: 30 TABLET | Refills: 0 | Status: SHIPPED | OUTPATIENT
Start: 2025-04-08

## 2025-04-08 RX ORDER — HYDRALAZINE HYDROCHLORIDE 50 MG/1
50 TABLET, FILM COATED ORAL 2 TIMES DAILY
Qty: 60 TABLET | Refills: 0 | Status: SHIPPED | OUTPATIENT
Start: 2025-04-08

## 2025-04-08 RX ORDER — IBUPROFEN 200 MG
1 TABLET ORAL DAILY
Qty: 30 PATCH | Refills: 0 | Status: SHIPPED | OUTPATIENT
Start: 2025-04-08

## 2025-04-08 RX ORDER — PREDNISONE 10 MG/1
TABLET ORAL
Qty: 30 TABLET | Refills: 0 | Status: SHIPPED | OUTPATIENT
Start: 2025-04-08 | End: 2025-04-20

## 2025-04-08 RX ADMIN — ATORVASTATIN CALCIUM 80 MG: 80 TABLET, FILM COATED ORAL at 08:47

## 2025-04-08 RX ADMIN — PSYLLIUM HUSK 1 PACKET: 3.4 POWDER ORAL at 08:46

## 2025-04-08 RX ADMIN — TRAMADOL HYDROCHLORIDE 50 MG: 50 TABLET, COATED ORAL at 08:57

## 2025-04-08 RX ADMIN — LISINOPRIL 40 MG: 40 TABLET ORAL at 08:46

## 2025-04-08 RX ADMIN — HYDRALAZINE HYDROCHLORIDE 50 MG: 50 TABLET ORAL at 08:46

## 2025-04-08 RX ADMIN — METOPROLOL SUCCINATE 50 MG: 50 TABLET, EXTENDED RELEASE ORAL at 08:46

## 2025-04-08 RX ADMIN — METHYLPREDNISOLONE SODIUM SUCCINATE 40 MG: 40 INJECTION, POWDER, FOR SOLUTION INTRAMUSCULAR; INTRAVENOUS at 06:10

## 2025-04-08 RX ADMIN — ESCITALOPRAM OXALATE 10 MG: 10 TABLET ORAL at 08:47

## 2025-04-08 RX ADMIN — GABAPENTIN 300 MG: 300 CAPSULE ORAL at 08:46

## 2025-04-08 RX ADMIN — GUAIFENESIN 1200 MG: 600 TABLET, EXTENDED RELEASE ORAL at 08:46

## 2025-04-08 RX ADMIN — HYDROXYZINE HYDROCHLORIDE 25 MG: 25 TABLET ORAL at 08:57

## 2025-04-08 RX ADMIN — ASPIRIN 81 MG: 81 TABLET, COATED ORAL at 08:46

## 2025-04-08 RX ADMIN — IPRATROPIUM BROMIDE AND ALBUTEROL SULFATE 3 ML: .5; 3 SOLUTION RESPIRATORY (INHALATION) at 06:55

## 2025-04-08 RX ADMIN — Medication 6 L/MIN: at 07:00

## 2025-04-08 RX ADMIN — AMLODIPINE BESYLATE 10 MG: 10 TABLET ORAL at 08:46

## 2025-04-08 ASSESSMENT — COGNITIVE AND FUNCTIONAL STATUS - GENERAL
MOBILITY SCORE: 23
CLIMB 3 TO 5 STEPS WITH RAILING: A LITTLE

## 2025-04-08 ASSESSMENT — PAIN DESCRIPTION - LOCATION: LOCATION: BACK

## 2025-04-08 ASSESSMENT — PAIN SCALES - GENERAL: PAINLEVEL_OUTOF10: 6

## 2025-04-08 NOTE — CARE PLAN
The patient's goals for the shift include comfort and rest    The clinical goals for the shift include no shortness of breath

## 2025-04-08 NOTE — CARE PLAN
The patient's goals for the shift include Better breathing    The clinical goals for the shift include Patients breathing will be controlled during shift      Problem: Pain - Adult  Goal: Verbalizes/displays adequate comfort level or baseline comfort level  Outcome: Progressing     Problem: Safety - Adult  Goal: Free from fall injury  Outcome: Progressing

## 2025-04-08 NOTE — DISCHARGE SUMMARY
Discharge Diagnosis  COPD exacerbation (Multi)    Issues Requiring Follow-Up  COPD    Discharge Meds     Medication List      START taking these medications     guaiFENesin 1,200 mg tablet extended release 12hr; Commonly known as:   Mucinex; Take 1 tablet (1,200 mg) by mouth 2 times a day. Do not crush,   chew, or split.   hydrALAZINE 50 mg tablet; Commonly known as: Apresoline; Take 1 tablet   (50 mg) by mouth 2 times a day.   hydrOXYzine HCL 25 mg tablet; Commonly known as: Atarax; Take 1 tablet   (25 mg) by mouth every 8 hours if needed for anxiety.   ondansetron 4 mg tablet; Commonly known as: Zofran; Take 1 tablet (4 mg)   by mouth every 8 hours if needed for nausea or vomiting.   oxygen gas therapy; Commonly known as: O2; Inhale 1 each every 12 hours.   predniSONE 10 mg tablet; Commonly known as: Deltasone; Take 4 tablets   (40 mg) by mouth once daily for 3 days, THEN 3 tablets (30 mg) once daily   for 3 days, THEN 2 tablets (20 mg) once daily for 3 days, THEN 1 tablet   (10 mg) once daily for 3 days.; Start taking on: April 8, 2025     CHANGE how you take these medications     amLODIPine 10 mg tablet; Commonly known as: Norvasc; Take 1 tablet (10   mg) by mouth once daily.; Start taking on: April 9, 2025; What changed:   medication strength, how much to take, when to take this     CONTINUE taking these medications     albuterol 90 mcg/actuation inhaler   Anoro Ellipta 62.5-25 mcg/actuation blister with inhaler device; Generic   drug: umeclidinium-vilanteroL   aspirin 81 mg EC tablet   atorvastatin 80 mg tablet; Commonly known as: Lipitor   benzonatate 200 mg capsule; Commonly known as: Tessalon; Take 1 capsule   (200 mg) by mouth 3 times a day as needed for cough.   escitalopram 10 mg tablet; Commonly known as: Lexapro   gabapentin 300 mg capsule; Commonly known as: Neurontin   lisinopril 40 mg tablet   metoprolol succinate XL 50 mg 24 hr tablet; Commonly known as: Toprol-XL   nicotine 14 mg/24 hr patch;  Commonly known as: Nicoderm CQ; Place 1   patch over 24 hours on the skin once daily.   traMADol 50 mg tablet; Commonly known as: Ultram   traZODone 50 mg tablet; Commonly known as: Desyrel   varenicline tartrate 0.5 mg (11)- 1 mg (42) tablet; Commonly known as:   Chantix TORY     STOP taking these medications     dicyclomine 10 mg capsule; Commonly known as: Bentyl   omeprazole 40 mg DR capsule; Commonly known as: PriLOSEC       Test Results Pending At Discharge  Pending Labs       No current pending labs.            Hospital Course   Radha Logan is a 79 y.o. female presents to the ED with complaints of shortness of breath.  States her symptoms been ongoing for the past 4 days.  Reports shortness of breath worse with activity/movement and improves at rest. States she normally wears 3 L of home oxygen at baseline, however has increased it to 5 L over the past couple days (was recently put on home oxygen a month ago).  Says she has noticed herself wheezing quite a bit at home.  Also reports a nonproductive cough over the past couple days.  Per EMR review, patient with recent admission at the beginning of March for similar symptoms, advised to follow-up with pulmonology outpatient and also cardiothoracic surgery for stable aortic aneurysm.      #Suspect COPD exacerbation  #Shortness of breath  #Wheezing  #Hypoxia  #Tachypnea  #Mild leukocytosis, 13.2  Admit for observation with telemetry monitor  COVID, flu, RSV negative.  Chest x-ray shows hyperinflated lungs, no evidence of acute cardiopulmonary process.  No consolidation.  IV azithromycin x 3 days  Patient given 125 mg IV Solu-Medrol in the ED, continue with 40 mg every 8 hours  Continuous oxygen, currently on 5 L, titrate back to baseline of 3 L.  Breathing treatments, Tylenol, Zofran as needed  Atarax as needed for anxiety  Cardiac/diabetic diet  Q 4 vitals  CBC, BMP in a.m.  PT/OT for evaluation and treatment  Patient states she has not smoked in the past  month  --Has appointment with pulmonology scheduled for next Wednesday     #Mildly elevated BNP, 118  #Dyspnea on exertion  Echocardiogram ordered for further evaluation, defer cardiology consult to attending pending results     Continue home medications as appropriate once med rec is completed      Chronic conditions:  COPD, hypothyroidism, aneurysm of descending thoracic aorta, chronic sinusitis, anxiety, history of NSTEMI, HLD, HTN, RBBB, hearing loss, tobacco use disorder, prediabetes     #DVT prophylaxis  Lovenox daily  SCDs, ambulation     4/6: Patient remains symptomatic. She is on 3L nasal cannula. She wears oxygen as needed at home. Leukocytosis improving from 24 down to 23. Continue current medications. Reassess tomorrow.      4/7: Leukocytosis improved from 23 down to 17. Continue current management. Decreased solumedrol to once daily. Patient is hypertensive. Increase amlodipine and add hydralazine. Continue to wean O2. Ideally patient gets to 3L or less before discharge as her home oxygen only goes to 5L.     4/8: Wheezing resolved. Patient discharged home on prednisone taper. She has an appointment with Pulmonology tomorrow. She already has home O2.    Pertinent Physical Exam At Time of Discharge  Physical Exam  Constitutional:       Appearance: She is ill-appearing.   HENT:      Head: Normocephalic and atraumatic.      Nose: Nose normal.      Mouth/Throat:      Mouth: Mucous membranes are moist.      Pharynx: Oropharynx is clear.   Eyes:      Extraocular Movements: Extraocular movements intact.      Pupils: Pupils are equal, round, and reactive to light.   Cardiovascular:      Rate and Rhythm: Normal rate and regular rhythm.   Pulmonary:      Effort: No respiratory distress.      Breath sounds: No wheezing, rhonchi or rales.   Abdominal:      General: Bowel sounds are normal. There is no distension.      Palpations: Abdomen is soft.      Tenderness: There is no abdominal tenderness. There is no  guarding.   Musculoskeletal:      Right lower leg: No edema.      Left lower leg: No edema.   Skin:     General: Skin is warm and dry.   Neurological:      Mental Status: She is alert and oriented to person, place, and time. Mental status is at baseline.   Psychiatric:         Mood and Affect: Mood normal.         Behavior: Behavior normal.         Outpatient Follow-Up  Future Appointments   Date Time Provider Department Center   4/9/2025 12:30 PM Trev Jamison MD [id number] [place]         Trev Saldivar DO

## 2025-04-08 NOTE — PROGRESS NOTES
25 1049   Discharge Planning   Living Arrangements Spouse/significant other   Support Systems Spouse/significant other   Assistance Needed None   Type of Residence Private residence   Number of Stairs to Enter Residence 0   Number of Stairs Within Residence 0   Do you have animals or pets at home? Yes   Type of Animals or Pets 1 cat   Who is requesting discharge planning? Provider   Home or Post Acute Services None   Expected Discharge Disposition Home   Does the patient need discharge transport arranged? No     TCC Note: Met with pt. at bedside, introduced self and role on the Transition of Care Team.  Verified name, , demographics, insurance, Pt stated that she does not have a PCP. Provided list of  PCPs who are currently accepting new pt's, for pt. Pt appreciative. Emergency contact is , Jose Logan Phone: [phone]. Pt. lives with  in home with no stairs.  Pt. is Independent with ADLs, drives and does household chores. Pt. denies any recent falls and does not use an assistive device for ambulation. Pt. is not diabetic and does use any home O2 3L NC PRN.   Preferred pharmacy is [facility].  No problems affording or obtaining medications. Pt. states that she has no discharge needs at this time. Transitions of Care will continue to follow until discharge. She José, MSN, RN, TCC.

## 2025-04-09 ENCOUNTER — OFFICE VISIT (OUTPATIENT)
Dept: SURGERY | Facility: CLINIC | Age: 80
End: 2025-04-09
Payer: MEDICARE

## 2025-04-09 ENCOUNTER — APPOINTMENT (OUTPATIENT)
Dept: SURGERY | Facility: CLINIC | Age: 80
End: 2025-04-09
Payer: MEDICARE

## 2025-04-09 VITALS
HEART RATE: 69 BPM | OXYGEN SATURATION: 90 % | BODY MASS INDEX: 20.73 KG/M2 | TEMPERATURE: 97.5 F | WEIGHT: 109.8 LBS | SYSTOLIC BLOOD PRESSURE: 106 MMHG | DIASTOLIC BLOOD PRESSURE: 60 MMHG | HEIGHT: 61 IN

## 2025-04-09 DIAGNOSIS — R91.8 LUNG NODULES: ICD-10-CM

## 2025-04-09 DIAGNOSIS — C34.12 MALIGNANT NEOPLASM OF UPPER LOBE, LEFT BRONCHUS OR LUNG (MULTI): ICD-10-CM

## 2025-04-09 DIAGNOSIS — R91.1 LUNG NODULE SEEN ON IMAGING STUDY: Primary | ICD-10-CM

## 2025-04-09 DIAGNOSIS — R91.1 LEFT UPPER LOBE PULMONARY NODULE: Primary | ICD-10-CM

## 2025-04-09 DIAGNOSIS — R91.1 PULMONARY NODULE 1 CM OR GREATER IN DIAMETER: ICD-10-CM

## 2025-04-09 DIAGNOSIS — R91.1 INCIDENTAL LUNG NODULE, GREATER THAN OR EQUAL TO 8MM: Primary | ICD-10-CM

## 2025-04-09 PROCEDURE — 99205 OFFICE O/P NEW HI 60 MIN: CPT | Performed by: THORACIC SURGERY (CARDIOTHORACIC VASCULAR SURGERY)

## 2025-04-09 PROCEDURE — 3078F DIAST BP <80 MM HG: CPT | Performed by: THORACIC SURGERY (CARDIOTHORACIC VASCULAR SURGERY)

## 2025-04-09 PROCEDURE — 1126F AMNT PAIN NOTED NONE PRSNT: CPT | Performed by: THORACIC SURGERY (CARDIOTHORACIC VASCULAR SURGERY)

## 2025-04-09 PROCEDURE — 1036F TOBACCO NON-USER: CPT | Performed by: THORACIC SURGERY (CARDIOTHORACIC VASCULAR SURGERY)

## 2025-04-09 PROCEDURE — 99215 OFFICE O/P EST HI 40 MIN: CPT | Performed by: THORACIC SURGERY (CARDIOTHORACIC VASCULAR SURGERY)

## 2025-04-09 PROCEDURE — 1159F MED LIST DOCD IN RCRD: CPT | Performed by: THORACIC SURGERY (CARDIOTHORACIC VASCULAR SURGERY)

## 2025-04-09 PROCEDURE — 1111F DSCHRG MED/CURRENT MED MERGE: CPT | Performed by: THORACIC SURGERY (CARDIOTHORACIC VASCULAR SURGERY)

## 2025-04-09 PROCEDURE — 3074F SYST BP LT 130 MM HG: CPT | Performed by: THORACIC SURGERY (CARDIOTHORACIC VASCULAR SURGERY)

## 2025-04-09 ASSESSMENT — ENCOUNTER SYMPTOMS
DEPRESSION: 0
LOSS OF SENSATION IN FEET: 0
OCCASIONAL FEELINGS OF UNSTEADINESS: 0

## 2025-04-09 ASSESSMENT — PAIN SCALES - GENERAL: PAINLEVEL_OUTOF10: 0-NO PAIN

## 2025-04-09 NOTE — PROGRESS NOTES
Bronchoscopy Scheduling Request    Pre-bronchoscopy visit: New patient visit with Bronchoscopy group provider  Please schedule procedure: Next available    Cytology on-site:  Yes  Location:  [facility]  Performing physician:  Advanced diagnostic bronchoscopist  Referring physician:  Trev Jamison MD, Demetris Coffey MD  Indication:  WAYNE Nodule 12 mm  Sedation / Anesthesia:  GA  Procedure:  Airway exam, BAL, TBNA, TBBx, Navigational bronchoscopy, Radial EBUS, Staging EBUS  Time:  Tier 3  Fluorscopy:   Yes  Imaging needed:  CT Junito - same day as procedure  Labs:  None  Meds:  Clarify medications- Per request on Lovenox?    Special Considerations:  Medication clarification.  Chronic hypoxemic respiratory failure- on O2.    Reviewed by:  Jessica Acuna MD

## 2025-04-09 NOTE — PROGRESS NOTES
"Kiet Logan  is a 79 y.o. female who presents for evaluation of 12 mm spiculated left upper lobe nodule.    This patient presented to the emergency department in 2025 with shortness of breath.  She had been treated with antibiotics, but continued to have symptoms.  At that time she received a CT scan of the chest abdomen and pelvis.  The CT scan of the chest showed a 12 mm spiculated nodule in the left upper lobe which was \"highly suspicious for primary neoplasm\".    Currently the patient is in their usual state of health and reporting symptoms of chronic (months) of difficulty breathing. She reports the following symptoms: shortness of breath with activity and cough and denies the following symptoms: chest pain, shortness of breath at rest, cough, hemoptysis, fevers, chills, and weight loss. She uses chronic supplemental 4L o2.       She  has a past medical history of Lung nodule and Vertigo (2009). past medical history of subclinical hyperthyroidism, actinic keratosis, aneurysm of descending thoracic aorta without rupture, chronic sinusitis, anxiety, NSTEMI, hyperlipidemia, hypotension, right bundle branch block, sensorineural hearing loss, tobacco use,   She  has a past surgical history that includes  section, classic.  She   Family History   Problem Relation Name Age of Onset    Brain cancer Sister      Lung cancer Sister      Lung cancer Niece         She  reports that she has quit smoking. Her smoking use included cigarettes. She started smoking about 45 years ago. She has a 45.3 pack-year smoking history. She has never used smokeless tobacco. She reports that she does not currently use alcohol. She reports that she does not currently use drugs.    Objective   Physical Exam  The patient is well-appearing and in no acute distress. The trachea is midline and there is no crepitus. The lungs were clear to auscultation grossly. There was good effort and excursion. The heart had a " regular rate and rhythm. The abdomen was soft, nontender and nondistended. The extremities had no edema or gross deformities. Mood and affect are appropriate.  Diagnostic Studies  I reviewed the test results described in the HPI    Assessment/Plan   I believe that the patient has a lung nodule of unclear etiology.     Based on the patient's clinical presentation and available radiographic imaging, I believe the lung nodule is concerning for a malignant process.  We discussed various management strategies including surgery, biopsy, and observation.  Based on this discussion, the patient elected for biopsy, PET and PFTs. The patient has reduced exercise tolerance and may not be able to tolerate suregry, for early stage cancer, she may be a candidate for SBRT-like treatment.     I recommend PET CT, Bronchoscopy, and pulmonary function testing (PFTs) - patient also considering CT biopsy.     I discussed this in detail with the patient, including a discussion of alternatives. They were comfortable with this approach.     Trev Jamison MD  [phone]

## 2025-04-11 NOTE — DOCUMENTATION CLARIFICATION NOTE
"    PATIENT:               LOY VALLE  ACCT #:                  [id number]  MRN:                       [id number]  :                       1945  ADMIT DATE:       4/3/2025 5:03 PM  DISCH DATE:        2025 12:14 PM  RESPONDING PROVIDER #:        [id number]          PROVIDER RESPONSE TEXT:    Acute on Chronic Hypoxemic Respiratory Failure    CDI QUERY TEXT:    Clarification        Instruction:    Based on your assessment of the patient and the clinical information, please provide the requested documentation by clicking on the appropriate radio button and enter any additional information if prompted.    Question: Is there a diagnosis indicative of the clinical information    When answering this query, please exercise your independent professional judgment. The fact that a question is being asked, does not imply that any particular answer is desired or expected.    The patient's clinical indicators include:  Clinical Information:  79 year old female presented with SOB.    Clinical Indicators:  4/3/2025 ED Provider Note:  \" Patient states upon arriving back in the room shortness of breath became much worse.  Patient was placed on pulse ox and was found to be 86%, O2 was increased to 6 L and patient improved.\"    4/3/2025 H&P:  \"presents to the ED with complaints of shortness of breath.  States her symptoms been ongoing for the past 4 days.  Reports shortness of breath worse with activity/movement and improves at rest...States she normally wears 3 L of home oxygen at baseline, however has increased it to 5 L over the past couple days.... VBG shows pH 7.38, pCO2 50, pO2 56, HCO3 29.6.  Chest x-ray shows hyperinflated lungs, no evidence of acute cardiopulmonary process...  Physical Exam: Pulmonary:  Comments:  Tachypnea.  Hypoxic, currently 92% on 5 L oxygen via nasal cannula.  Mild conversational dyspnea.\"    Treatment:  - - IV Solu-Medrol  -  - DuoNeb  - 4/3 albuterol nebulization  - CXR  - " supplemental O2, 4/3-4/7 3-5 L O2 n/c,    Risk Factors:  COPD exacerbation, hypoxia, dependence on O2  Options provided:  -- Acute on Chronic Hypoxemic Respiratory Failure  -- Chronic Hypoxemic Respiratory Failure  -- Other - I will add my own diagnosis  -- Refer to Clinical Documentation Reviewer    Query created by: Brook Segovia on 4/7/2025 1:05 PM      Electronically signed by:  KWAKU INAFNTE DO 4/11/2025 12:36 PM

## 2025-04-16 DIAGNOSIS — J44.1 COPD EXACERBATION (MULTI): Primary | ICD-10-CM

## 2025-04-18 ENCOUNTER — TELEPHONE (OUTPATIENT)
Dept: PRIMARY CARE | Facility: CLINIC | Age: 80
End: 2025-04-18
Payer: MEDICARE

## 2025-04-18 NOTE — TELEPHONE ENCOUNTER
Lvm re: pulmonary consult - indication is that patient will be following up with [facility] for her care

## 2025-04-23 ENCOUNTER — APPOINTMENT (OUTPATIENT)
Dept: RADIOLOGY | Facility: CLINIC | Age: 80
End: 2025-04-23
Payer: MEDICARE

## 2025-04-30 ENCOUNTER — APPOINTMENT (OUTPATIENT)
Dept: SURGERY | Facility: CLINIC | Age: 80
End: 2025-04-30
Payer: MEDICARE

## 2025-05-13 ENCOUNTER — APPOINTMENT (OUTPATIENT)
Dept: RESPIRATORY THERAPY | Facility: HOSPITAL | Age: 80
End: 2025-05-13
Payer: MEDICARE

## 2025-06-26 DIAGNOSIS — Z01.818 PRE-OP TESTING: ICD-10-CM

## 2025-06-26 DIAGNOSIS — R91.1 LUNG NODULE: ICD-10-CM

## 2025-07-01 LAB
ANION GAP SERPL CALCULATED.4IONS-SCNC: 10 MMOL/L (CALC) (ref 7–17)
BUN SERPL-MCNC: 12 MG/DL (ref 7–25)
BUN/CREAT SERPL: NORMAL (CALC) (ref 6–22)
CALCIUM SERPL-MCNC: 9.7 MG/DL (ref 8.6–10.4)
CHLORIDE SERPL-SCNC: 101 MMOL/L (ref 98–110)
CO2 SERPL-SCNC: 27 MMOL/L (ref 20–32)
CREAT SERPL-MCNC: 0.8 MG/DL (ref 0.6–1)
EGFRCR SERPLBLD CKD-EPI 2021: 76 ML/MIN/1.73M2
ERYTHROCYTE [DISTWIDTH] IN BLOOD BY AUTOMATED COUNT: 13 % (ref 11–15)
GLUCOSE SERPL-MCNC: 90 MG/DL (ref 65–99)
HCT VFR BLD AUTO: 45.4 % (ref 35–45)
HGB BLD-MCNC: 14.9 G/DL (ref 11.7–15.5)
MCH RBC QN AUTO: 30.4 PG (ref 27–33)
MCHC RBC AUTO-ENTMCNC: 32.8 G/DL (ref 32–36)
MCV RBC AUTO: 92.7 FL (ref 80–100)
PLATELET # BLD AUTO: 382 THOUSAND/UL (ref 140–400)
PMV BLD REES-ECKER: 9.5 FL (ref 7.5–12.5)
POTASSIUM SERPL-SCNC: 4.6 MMOL/L (ref 3.5–5.3)
RBC # BLD AUTO: 4.9 MILLION/UL (ref 3.8–5.1)
SODIUM SERPL-SCNC: 138 MMOL/L (ref 135–146)
WBC # BLD AUTO: 10.8 THOUSAND/UL (ref 3.8–10.8)

## 2025-07-16 ENCOUNTER — TELEMEDICINE (OUTPATIENT)
Dept: PULMONOLOGY | Facility: HOSPITAL | Age: 80
End: 2025-07-16
Payer: MEDICARE

## 2025-07-16 DIAGNOSIS — J96.11 CHRONIC HYPOXIC RESPIRATORY FAILURE: ICD-10-CM

## 2025-07-16 DIAGNOSIS — J44.9 CHRONIC OBSTRUCTIVE PULMONARY DISEASE, UNSPECIFIED COPD TYPE (MULTI): ICD-10-CM

## 2025-07-16 DIAGNOSIS — R91.1 LEFT UPPER LOBE PULMONARY NODULE: Primary | ICD-10-CM

## 2025-07-16 PROCEDURE — 99203 OFFICE O/P NEW LOW 30 MIN: CPT | Performed by: NURSE PRACTITIONER

## 2025-07-16 ASSESSMENT — ENCOUNTER SYMPTOMS
SHORTNESS OF BREATH: 1
DIARRHEA: 0
VOMITING: 0
DIZZINESS: 0
FEVER: 0
RHINORRHEA: 0
WHEEZING: 0
NAUSEA: 0
UNEXPECTED WEIGHT CHANGE: 0
LIGHT-HEADEDNESS: 0
CHILLS: 0
APPETITE CHANGE: 0
COUGH: 1
TROUBLE SWALLOWING: 0

## 2025-07-16 NOTE — PROGRESS NOTES
[facility]  Pre-Bronchoscopy Visit Note           PATIENT INFORMATION     Patient Name: Radha Logan  MRN: [id number]  YOB: 1945 (79 y.o.)    REASON FOR VISIT     Pre-bronchoscopy evaluation    Referring Provider: No ref. provider found    HISTORY OF PRESENT ILLNESS     Virtual or Telephone Consent:    An interactive audio and video telecommunication system which permits real time communications between the patient (at the originating site) and provider (at the distant site) was utilized to provide this telehealth service.     Verbal consent was requested and obtained from Radha Logan on this date, 07/16/25 for a telehealth visit and the patient's location was confirmed at the time of the visit.     Radha Logan is a 79 y.o. female with a past medical history of COPD, nicotine dependence, CAD, NSTEMI, hypertension, hyperlipidemia, aneurysm of descending thoracic aorta, chronic sinusitis, anxiety who presents virtually to [facility] for a pre-bronchoscopy evaluation.    She had been hospitalized 2/2025 for pneumonia and ultimately left AMA; she was provided Levaquin on release but had only taken it for 1 day due to nausea and vomiting. She was hospitalized again 3/2025 for COPD exacerbation and acute hypoxic respiratory failure, as well as concern for pneumonia. CTA chest was completed at this time which was negative for PE but otherwise showed a 12 mm spiculated nodule in the left upper lobe and severe pulmonary emphysema, as well as extensive endobronchial mucous plugging in the lower lobes. Plan was for outpatient thoracic surgery follow-up. Noted that she was hospitalized again 4/2025 for COPD exacerbation and increased oxygen requirements. She subsequently saw Dr. Jamison on 4/9/2025 and plan was to proceed with bronchoscopy. Patient had then chosen to pursue her care/bronchoscopy through [facility]. Bronchoscopy was  completed on 6/4/2025, however there was concern for inadequate/nondiagnostic tissue sampling. She has now been referred back to  for navigational bronchoscopy. She is now scheduled for bronchoscopy 7/17/2025 with airway exam, BAL, TB NA, TBBx, navigational bronchoscopy, radial EBUS, and staging EBUS.    On today's visit, the patient reports she is doing okay. She reports stable shortness of breath on exertion which she reports is related to her COPD history. She states she is able to complete ADLs and activities such as shopping without her breathing interfering. She also reports a mild chronic cough that she notices occasionally daily that is sometimes productive for phlegm. She denies hemoptysis. She denies wheezing or chest pain. She denies recent fevers, chills, or night sweats. She reports weight has been stable and appetite is good.    PULMONARY HISTORY     Previous Pulmonary Diagnoses & Treatments:  History of COPD  Patient reports history of COPD. PFT 5/2025 did not show obstruction, although emphysematous changes noted on recent CT chest imaging; significant smoking history as well.  On Anoro and albuterol HFA as needed.  [facility]  Chronic hypoxic respiratory failure  Recently prescribed supplemental oxygen after she was discharged from the hospital 3/2025.  Currently uses 3 L as needed during the day.    Sleep Disorder History:  Denies.    MEDICAL & SURGICAL HISTORY     Medical History[1]     Surgical History[2]     FAMILY HISTORY     Family Pulmonary History:  Sister - lung cancer  Niece - lung cancer    Family History[3]     SOCIAL HISTORY     Tobacco Use History:   Currently smoking 5 cigarettes/day - had quit 2/2025 but started again a couple weeks ago. She started at age 16 and previously smoked 1 PPD on average.    Vaping History:  Denies.    Marijuana Use History:  Denies.    Other Drug Use:  Denies.    Social History     Socioeconomic History    Marital status:       Spouse name: Not on file    Number of children: Not on file    Years of education: Not on file    Highest education level: Not on file   Occupational History    Not on file   Tobacco Use    Smoking status: Some Days     Current packs/day: 1.00     Average packs/day: 1 pack/day for 45.5 years (45.5 ttl pk-yrs)     Types: Cigarettes     Start date: 1980    Smokeless tobacco: Never   Substance and Sexual Activity    Alcohol use: Not Currently    Drug use: Not Currently    Sexual activity: Not Currently     Partners: Male     Birth control/protection: None   Other Topics Concern    Not on file   Social History Narrative    Not on file     Social Drivers of Health     Financial Resource Strain: Low Risk  (4/16/2025)    Received from [employer]    Overall Financial Resource Strain (CARDIA)     Difficulty of Paying Living Expenses: Not hard at all   Food Insecurity: No Food Insecurity (4/16/2025)    Received from [employer]    Hunger Vital Sign     Worried About Running Out of Food in the Last Year: Never true     Ran Out of Food in the Last Year: Never true   Transportation Needs: No Transportation Needs (4/16/2025)    Received from [employer]    PRAPARE - Transportation     Lack of Transportation (Medical): No     Lack of Transportation (Non-Medical): No   Physical Activity: Insufficiently Active (4/16/2025)    Received from [employer]    Exercise Vital Sign     Days of Exercise per Week: 2 days     Minutes of Exercise per Session: 10 min   Stress: No Stress Concern Present (4/16/2025)    Received from [employer]    [employer] - Occupational Stress Questionnaire     Feeling of Stress : Only a little   Social Connections: Moderately Isolated (4/16/2025)    Received from [employer]    Social Connection and Isolation Panel     Frequency of Communication with Friends and Family: More than three times a week     Frequency of Social Gatherings with Friends and Family: Twice a  week     Attends Temple Services: Never     Active Member of Clubs or Organizations: No     Attends Club or Organization Meetings: Never     Marital Status:    Intimate Partner Violence: Not At Risk (4/16/2025)    Received from [employer]    Humiliation, Afraid, Rape, and Kick questionnaire     Fear of Current or Ex-Partner: No     Emotionally Abused: No     Physically Abused: No     Sexually Abused: No   Housing Stability: Low Risk  (4/16/2025)    Received from [employer]    Housing Stability Vital Sign     Unable to Pay for Housing in the Last Year: No     Number of Times Moved in the Last Year: 0     Homeless in the Last Year: No      IMMUNIZATION HISTORY     Immunization History   Administered Date(s) Administered    COVID-19, mRNA, LNP-S, PF, 30 mcg/0.3 mL dose 03/04/2021, 03/25/2021, 12/02/2021    Flu vaccine (IIV4), preservative free *Check age/dose* 12/07/2017, 10/15/2018, 10/18/2019    Flu vaccine, quadrivalent, no egg protein, age 6 month or greater (FLUCELVAX) 09/22/2020    Flu vaccine, trivalent, preservative free, age 6 months and greater (Fluarix/Fluzone/Flulaval) 11/15/2016    Influenza, seasonal, injectable 09/29/2015    Pneumococcal conjugate vaccine, 13-valent (PREVNAR 13) 09/29/2015    Pneumococcal polysaccharide vaccine, 23-valent, age 2 years and older (PNEUMOVAX 23) 06/27/2013    Td vaccine, age 7 years and older (TENIVAC) 10/15/1994    Tdap vaccine, age 7 year and older (BOOSTRIX, ADACEL) 06/13/2018    Zoster vaccine, recombinant, adult (SHINGRIX) 09/11/2018, 01/15/2019      REVIEW OF SYSTEMS     Review of Systems   Constitutional:  Negative for appetite change, chills, fever and unexpected weight change.   HENT:  Negative for congestion, rhinorrhea and trouble swallowing.    Respiratory:  Positive for cough (occasional phlegm) and shortness of breath (on exertion). Negative for wheezing.    Cardiovascular:  Negative for chest pain and leg swelling.   Gastrointestinal:  Negative  for diarrhea, nausea and vomiting.   Neurological:  Negative for dizziness, syncope and light-headedness.      CURRENT MEDICATIONS     Current Medications[4]    ALLERGIES     RX Allergies[5]     VITAL SIGNS & WEIGHTS     Vital signs not taken (virtual visit)    Previous Weights:   Wt Readings from Last 3 Encounters:   04/09/25 49.8 kg (109 lb 12.8 oz)   04/08/25 47 kg (103 lb 9.9 oz)   03/01/25 48.1 kg (106 lb)     PHYSICAL EXAM     Limited (virtual visit)   Physical Exam  Constitutional:       General: She is not in acute distress.  Pulmonary:      Effort: No respiratory distress.     Neurological:      Mental Status: She is alert and oriented to person, place, and time.        DIAGNOSTIC DATA     PULMONARY FUNCTION TESTS (PFTs)     PFT 5/2025:  Impression:   -Overall, pulmonary function tests are consistent with essentially normal   findings.   -Isolated reduction in DLCO-consider differentials such as pulmonary   vascular disease, early IDL, emphysema.         IMAGING     PET LUNG INITIALPRO/PI   05/20/2025 02:22 PM   CLINICAL HISTORY: Pulmonary nodule   ASSOCIATED DIAGNOSIS: Pulmonary nodule   ORDERING PROVIDER: KASSI SALEEM   TECHNOLOGISTS NOTE:  Wt= 109 lb Ht= 5'1 Dm= neg Bg= 104 mg/dl Right hand injection   COMPARISON: CT chest 3/1/2025, PET scan 7/10/2024 and CT abdomen and pelvis 11/17/2023   TECHNIQUE: At approximately 1 hour following the intravenous administration of F18 FDG, whole body PET/CT imaging was performed. This imaging consisted of unenhanced low dose spiral axial 3.75 mm collimated CT imaging from the base of the skull  through the thighs which was utilized for PET attenuation correction as well as anatomic localization. This was followed by full ring positron emission tomography (PET).   INTRA-PROCEDURE MEDS: fludeoxyglucose F18 (FDG)  MCI/ML solution 17.4 millicurie Route: Intravenous     FINDINGS:   Mediastinal blood pool SUV mean: 1.9   Liver background SUV mean: 2.2      HEAD/NECK:     No areas of abnormal FDG avidity are seen. No neck mass or adenopathy is noted. Atheromatous calcification of the carotid bifurcations..     CHEST:     Interval increase in size and density of the spiculated left upper lobe pulmonary nodule when compared to PET scan from 7/10/2024, with increased FDG avidity (SUV max 6.8, previously SUV max 2.9).     There is also increased hypermetabolism within a previously seen left hilar node (SUV max 4.6, previously SUV max 2.7). Additionally, there is unchanged mild hypermetabolism within a right lower paratracheal lymph node (SUV max 2.9).   Additional numerous mixed composition pulmonary nodules measuring up to 8 mm in the right lower lobe are grossly unchanged from 3/1/2025 CT chest and non-FDG avid, although below PET detection threshold.     Mixed paraseptal and severe centrilobular emphysema. Dense triple vessel coronary artery and aortic valvular calcification. Unchanged mild dilation of the descending aorta, measuring up to 4.1 cm.     ABDOMEN/PELVIS:     No areas of abnormal FDG avidity are seen. .     Splenic granulomas. Diverticulosis. Extensive atherosclerotic disease of the abdominal aorta and its branches.     MUSCULOSKELETAL:     No areas of abnormal FDG avidity are seen. Grade 1 anterolisthesis of L4 on L5. Multilevel degenerative changes and diffuse idiopathic skeletal hyperostosis of the spine. No destructive osseous lesion is noted. .     IMPRESSION:   1. Increased FDG avidity within the left upper lobe pulmonary nodule and left hilar lymph node, consistent with primary lung malignancy associated with lexie metastasis to the left hilum.   2.  Stable mildly hypermetabolic right lower paratracheal mediastinal lymph node is unchanged and equivocal, and may be reactive or metastatic. Transbronchial biopsy should be considered for definitive characterization.   3.  Indeterminate bilateral, mixed density pulmonary nodules measuring up to 8 mm  are stable, although below PET detection threshold.   4.  No evidence of distant metastatic disease.     CT ANGIO CHEST FOR PULMONARY EMBOLISM; CT ANGIO ABDOMEN PELVIS W  AND/OR WO IV IV CONTRAST;  3/1/2025 12:18 pm; 3/1/2025 12:17 pm      INDICATION:  Signs/Symptoms:concern for PE/pneumonia; Signs/Symptoms:cp, possibly  aortic pathology see on ct angio chest.      COMPARISON:  None.      ACCESSION NUMBER(S):  [id number]; [id number]      ORDERING CLINICIAN:  MARK DOWD      TECHNIQUE:  CT of the chest, abdomen, and pelvis was performed.  Contiguous axial  images were obtained at 3 mm slice thickness through the chest,  abdomen and pelvis. Coronal and sagittal reconstructions at 3mm slice  thickness were performed.  75 mL Omnipaque 350 administered intravenously without immediate  complication. MIP reformatted images were generated.      FINDINGS:  CHEST:      LUNG/PLEURA/LARGE AIRWAYS:  Severe emphysematous changes are present.  There is a spiculated nodule in the left upper lobe anteriorly  measuring 12 mm. A few nodules in the right lower lobe appears linear  on orthogonal planes suggesting scars for example measuring 0.7 cm  (Series 406, Image 190) and 0.5 cm (Series 406, Image 220). Calcified  granulomas in the right lower lobe are also noted. Otherwise the  lungs appear clear. No pleural effusion or pneumothorax  Central airways are patent. There are areas endobronchial mucous  plugging involving both lower lobes.      VESSELS:  No pulmonary embolism identified.  Severe coronary atherosclerosis.  Severe thoracic aortic atherosclerosis. Ascending aorta is not  significantly dilated measuring 34 mm. There is extensive mixing  artifact throughout the descending thoracic aorta which is more  dilated than the ascending aorta. The proximal descending aorta  measures 36 mm and more distally there is a fusiform aneurysm  measuring 41 mm. Aneurysm does not extend below the diaphragmatic  hiatus.      The thoracic  great vessels are tortuous but otherwise patent.      There is severe abdominal aortic atherosclerosis without aneurysm.  The abdominal aorta is diffusely ectatic measuring up to 29 mm  proximally. The celiac, superior mesenteric, right renal, left renal,  and inferior mesenteric arteries are patent.      There is severe atherosclerosis of the bilateral common, internal,  and external iliac arteries which are otherwise patent without  aneurysm.      HEART:  Mild cardiomegaly. No significant pericardial effusion.      MEDIASTINUM AND MONSERRAT:  No lymphadenopathy.  The esophagus is not dilated.      CHEST WALL AND LOWER NECK:  No lymphadenopathy.  The visualized thyroid is grossly unremarkable.      ABDOMEN:      LIVER:  A few scattered calcified granulomas.      BILE DUCTS:  Not dilated.      GALLBLADDER:  No calcified stones or definite inflammation.      PANCREAS:  Unremarkable.      SPLEEN:  Scattered calcified granulomas.      ADRENAL GLANDS:  Unremarkable.      KIDNEYS AND URETERS:  Normal in cortical thickness without hydronephrosis.   No focal renal  lesions.      PELVIS:      BLADDER:  Partially distended.      REPRODUCTIVE ORGANS:  Uterus is present.      BOWEL:  No findings of bowel inflammation or obstruction.   Unremarkable  appendix.   Unremarkable mesentery.          VESSELS:  See above.  Major portal venous branches grossly appear patent.  IVC and visualized major branches are patent.      PERITONEUM/RETROPERITONEUM/LYMPH NODES:  No free fluid or free air.  No abdominal or pelvic lymphadenopathy.      BONE AND SOFT TISSUE:  No focal suspicious skeletal lesions.   Degenerative changes of the  spine. Grade 1 anterolisthesis at L4-5.          IMPRESSION:  CHEST:  1.  12 mm spiculated nodule in the left upper lobe, highly suspicious  for primary neoplasm, especially in a patient with evidence of severe  pulmonary emphysema. Suggest nonemergent referral for tissue  sampling. No findings of metastatic disease  of the thorax otherwise  identified.  2. No pulmonary embolism identified.  3. No acute aortic pathology. There is extensive mixing artifact  related to turbulent flow within the descending thoracic aorta,  relating to diffuse dilatation compared to the ascending aorta with  distal thoracic descending aortic aneurysm up to 4.1 cm in diameter.  Otherwise no evidence of aortic dissection or other acute pathology  identified.  4. Extensive endobronchial mucous plugging in the lower lobes.  Otherwise no focal pulmonary infiltrates identified.      ABDOMEN-PELVIS:  1.  No definite acute findings.  2. Severe atherosclerosis with diffuse ectasia of the abdominal aorta  up to 2.9 cm.    RECENT LABS      Latest Reference Range & Units 06/30/25 12:53   WHITE BLOOD CELL COUNT 3.8 - 10.8 Thousand/uL 10.8   RED BLOOD CELL COUNT 3.80 - 5.10 Million/uL 4.90   HEMOGLOBIN 11.7 - 15.5 g/dL 14.9   HEMATOCRIT 35.0 - 45.0 % 45.4 (H)   MCV 80.0 - 100.0 fL 92.7   MCH 27.0 - 33.0 pg 30.4   MCHC 32.0 - 36.0 g/dL 32.8   RDW 11.0 - 15.0 % 13.0   PLATELET COUNT 140 - 400 Thousand/uL 382   MPV 7.5 - 12.5 fL 9.5   (H): Data is abnormally high     Latest Reference Range & Units 06/30/25 12:53   GLUCOSE 65 - 99 mg/dL 90   SODIUM 135 - 146 mmol/L 138   POTASSIUM 3.5 - 5.3 mmol/L 4.6   CHLORIDE 98 - 110 mmol/L 101   CARBON DIOXIDE 20 - 32 mmol/L 27   ELECTROLYTE BALANCE 7 - 17 mmol/L (calc) 10   UREA NITROGEN (BUN) 7 - 25 mg/dL 12   CREATININE 0.60 - 1.00 mg/dL 0.80   EGFR > OR = 60 mL/min/1.73m2 76   BUN/CREATININE RATIO 6 - 22 (calc) SEE NOTE:   CALCIUM 8.6 - 10.4 mg/dL 9.7     ASSESSMENT & PLAN     Radha Logan is a 79 y.o. female with a past medical history of COPD, nicotine dependence, CAD, NSTEMI, hypertension, hyperlipidemia, aneurysm of descending thoracic aorta, chronic sinusitis, anxiety who presents virtually to [facility] for a pre-bronchoscpy evaluation.     Left upper lobe lung nodule  She had been  hospitalized 2/2025 for pneumonia and ultimately left AMA; she was provided Levaquin on release but had only taken it for 1 day due to nausea and vomiting. She was hospitalized again 3/2025 for COPD exacerbation and acute hypoxic respiratory failure, as well as concern for pneumonia. CTA chest was completed at this time which was negative for PE but otherwise showed a 12 mm spiculated nodule in the left upper lobe and severe pulmonary emphysema, as well as extensive endobronchial mucous plugging in the lower lobes. Plan was for outpatient thoracic surgery follow-up. Noted that she was hospitalized again 4/2025 for COPD exacerbation and increased oxygen requirements. She subsequently saw Dr. Jamison on 4/9/2025 and plan was to proceed with bronchoscopy. Patient had then chosen to pursue her care/bronchoscopy through [facility]. Bronchoscopy was completed on 6/4/2025, however there was concern for inadequate/nondiagnostic tissue sampling. She has now been referred back to  for navigational bronchoscopy. She is now scheduled for bronchoscopy 7/17/2025 with airway exam, BAL, TB NA, TBBx, navigational bronchoscopy, radial EBUS, and staging EBUS.  Plan to proceed with bronchoscopy as scheduled on 7/17/2025.  Obtain CT chest for navigational bronch planning prior to procedure.  No need for updated labs/labs up to date.   Patient to be NPO after midnight the night before bronchoscopy.  Patient confirmed that she is not on Lovenox or any other blood thinner.    COPD/emphysema  Nicotine dependence  Chronic hypoxic respiratory failure  Patient reports history of COPD. PFT 5/2025 did not show obstruction, although emphysematous changes noted on recent CT chest imaging; significant smoking history as well. Currently using Anoro and albuterol HFA as needed. Currently uses supplemental oxygen at 3 L as needed during the day. Established with [facility] pulmonary.  Continue current bronchodilator regimen.  Continue supplemental  oxygen as needed.  Smoking cessation encouraged.  Follow-up with her pulmonologist as directed.    Virtual Visit Consent:  A video visit  between the patient (located at home) and provider (located at remote office in [place], [place]) was utilized to provide this telehealth service.  Prep Time: 20 minutes  Video Time: 16 minutes  Total Time Spent: 36 minutes    I explained the bronchoscopy procedure to the patient. We discussed that the bronchoscopy will be performed by a member of the Interventional Pulmonary Team, depending on scheduling and provider availability. We also discussed that the IP providers function as a team and not infrequently may have to fill in for one another if there are emergent issues that need attention at the same time. The patient expressed understanding and agreed to proceed. All questions were answered.      Hollie Brambila, APRN-CNP  2025    Thank you for visit with the  Pulmonary Clinic.  Pulmonary Office: [phone]  Bronchoscopy Scheduling: Yudi - [phone] / Jay - [phone]   Radiology Scheduling: [phone]   General Appointment Scheduling: [phone]   Pulmonary Function Testing: [phone]            [1]   Past Medical History:  Diagnosis Date    Clotting disorder (Multi)     COPD (chronic obstructive pulmonary disease) (Multi)     Hypertension     Lung nodule     Myocardial infarction (Multi)     Vertigo 2009   [2]   Past Surgical History:  Procedure Laterality Date    BRONCHOSCOPY  2025     SECTION, CLASSIC      LUNG BIOPSY  2025   [3]   Family History  Problem Relation Name Age of Onset    Brain cancer Sister      Lung cancer Sister      Lung cancer Niece      Cancer Sister Caroline Head     Heart disease Father Miguel Head     Epilepsy Sister Caroline Head 10 - 19    Hypertension Mother Kae Jim    [4]   Current Outpatient Medications   Medication Sig Dispense Refill    albuterol 90 mcg/actuation inhaler Inhale  2 puffs every 4 hours if needed for wheezing or shortness of breath.      amLODIPine (Norvasc) 10 mg tablet Take 1 tablet (10 mg) by mouth once daily. 30 tablet 0    Anoro Ellipta 62.5-25 mcg/actuation blister with device Inhale 1 puff once daily.      aspirin 81 mg EC tablet Take 1 tablet (81 mg) by mouth once daily.      atorvastatin (Lipitor) 80 mg tablet Take 1 tablet (80 mg) by mouth once daily.      benzonatate (Tessalon) 200 mg capsule Take 1 capsule (200 mg) by mouth 3 times a day as needed for cough. 20 capsule 0    escitalopram (Lexapro) 10 mg tablet Take 1 tablet (10 mg) by mouth once daily.      gabapentin (Neurontin) 300 mg capsule Take 1 capsule (300 mg) by mouth 2 times a day.      guaiFENesin (Mucinex) 1,200 mg tablet extended release 12hr Take 1 tablet (1,200 mg) by mouth 2 times a day. Do not crush, chew, or split. (Patient not taking: Reported on 4/9/2025) 30 tablet 0    hydrALAZINE (Apresoline) 50 mg tablet Take 1 tablet (50 mg) by mouth 2 times a day. 60 tablet 0    hydrOXYzine HCL (Atarax) 25 mg tablet Take 1 tablet (25 mg) by mouth every 8 hours if needed for anxiety. 30 tablet 0    lisinopril 40 mg tablet Take 1 tablet (40 mg) by mouth once daily.      metoprolol succinate XL (Toprol-XL) 50 mg 24 hr tablet Take 1 tablet (50 mg) by mouth once daily.      nicotine (Nicoderm CQ) 14 mg/24 hr patch Place 1 patch over 24 hours on the skin once daily. 30 patch 0    ondansetron (Zofran) 4 mg tablet Take 1 tablet (4 mg) by mouth every 8 hours if needed for nausea or vomiting. 20 tablet 0    oxygen (O2) gas therapy Inhale 1 each every 12 hours.      traMADol (Ultram) 50 mg tablet Take 1 tablet (50 mg) by mouth every 6 hours if needed.      traZODone (Desyrel) 50 mg tablet Take 2 tablets (100 mg) by mouth once daily at bedtime.       No current facility-administered medications for this visit.   [5]   Allergies  Allergen Reactions    Other Unknown     Macrolides    Tetracycline Nausea/vomiting     Azithromycin Nausea Only    Codeine Itching    Doxycycline Nausea Only    Levaquin [Levofloxacin] Nausea/vomiting    Penicillins Nausea/vomiting     vomiting

## 2025-07-16 NOTE — PATIENT INSTRUCTIONS
Plan to proceed with bronchoscopy as scheduled on 7/17/2025 at [facility] - [address] [place] [address].  Nothing by mouth after midnight the night before procedure.  CT chest before procedure on 7/17/2025, at 9:30 AM - [facility], 2nd floor Radiology, [address]  Bronchoscopy on 7/17/2025, check in at 10:30 AM - Bronchoscopy/Endoscopy Suite, [address]  Continue Anoro and albuterol as needed.  Continue supplemental oxygen as needed.    Thank you for visit with the  Pulmonary Clinic.  Pulmonary Office: [phone]  Bronchoscopy Scheduling: Yudi - [phone] / Jay - [phone]   Radiology Scheduling: [phone]   General Appointment Scheduling: [phone]   Pulmonary Function Testing: [phone]

## 2025-07-16 NOTE — H&P (VIEW-ONLY)
[facility] Pulmonary Medicine  Pre-Bronchoscopy Visit Note           PATIENT INFORMATION     Patient Name: Radha Logan  MRN: [id number]  YOB: 1945 (79 y.o.)    REASON FOR VISIT     Pre-bronchoscopy evaluation    Referring Provider: No ref. provider found    HISTORY OF PRESENT ILLNESS     Virtual or Telephone Consent:    An interactive audio and video telecommunication system which permits real time communications between the patient (at the originating site) and provider (at the distant site) was utilized to provide this telehealth service.     Verbal consent was requested and obtained from Radha Logan on this date, 07/16/25 for a telehealth visit and the patient's location was confirmed at the time of the visit.     Radha Logan is a 79 y.o. female with a past medical history of COPD, nicotine dependence, CAD, NSTEMI, hypertension, hyperlipidemia, aneurysm of descending thoracic aorta, chronic sinusitis, anxiety who presents virtually to [facility] Pulmonary Medicine Clinic for a pre-bronchoscopy evaluation.    She had been hospitalized 2/2025 for pneumonia and ultimately left AMA; she was provided Levaquin on release but had only taken it for 1 day due to nausea and vomiting. She was hospitalized again 3/2025 for COPD exacerbation and acute hypoxic respiratory failure, as well as concern for pneumonia. CTA chest was completed at this time which was negative for PE but otherwise showed a 12 mm spiculated nodule in the left upper lobe and severe pulmonary emphysema, as well as extensive endobronchial mucous plugging in the lower lobes. Plan was for outpatient thoracic surgery follow-up. Noted that she was hospitalized again 4/2025 for COPD exacerbation and increased oxygen requirements. She subsequently saw Dr. Jamison on 4/9/2025 and plan was to proceed with bronchoscopy. Patient had then chosen to pursue her care/bronchoscopy through [facility]. Bronchoscopy was  completed on 6/4/2025, however there was concern for inadequate/nondiagnostic tissue sampling. She has now been referred back to  for navigational bronchoscopy. She is now scheduled for bronchoscopy 7/17/2025 with airway exam, BAL, TB NA, TBBx, navigational bronchoscopy, radial EBUS, and staging EBUS.    On today's visit, the patient reports she is doing okay. She reports stable shortness of breath on exertion which she reports is related to her COPD history. She states she is able to complete ADLs and activities such as shopping without her breathing interfering. She also reports a mild chronic cough that she notices occasionally daily that is sometimes productive for phlegm. She denies hemoptysis. She denies wheezing or chest pain. She denies recent fevers, chills, or night sweats. She reports weight has been stable and appetite is good.    PULMONARY HISTORY     Previous Pulmonary Diagnoses & Treatments:  History of COPD  Patient reports history of COPD. PFT 5/2025 did not show obstruction, although emphysematous changes noted on recent CT chest imaging; significant smoking history as well.  On Anoro and albuterol HFA as needed.  [facility].  Chronic hypoxic respiratory failure  Recently prescribed supplemental oxygen after she was discharged from the hospital 3/2025.  Currently uses 3 L as needed during the day.    Sleep Disorder History:  Denies.    MEDICAL & SURGICAL HISTORY     Medical History[1]     Surgical History[2]     FAMILY HISTORY     Family Pulmonary History:  Sister - lung cancer  Niece - lung cancer    Family History[3]     SOCIAL HISTORY     Tobacco Use History:   Currently smoking 5 cigarettes/day - had quit 2/2025 but started again a couple weeks ago. She started at age 16 and previously smoked 1 PPD on average.    Vaping History:  Denies.    Marijuana Use History:  Denies.    Other Drug Use:  Denies.    Social History     Socioeconomic History    Marital status:       Spouse name: Not on file    Number of children: Not on file    Years of education: Not on file    Highest education level: Not on file   Occupational History    Not on file   Tobacco Use    Smoking status: Some Days     Current packs/day: 1.00     Average packs/day: 1 pack/day for 45.5 years (45.5 ttl pk-yrs)     Types: Cigarettes     Start date: 1980    Smokeless tobacco: Never   Substance and Sexual Activity    Alcohol use: Not Currently    Drug use: Not Currently    Sexual activity: Not Currently     Partners: Male     Birth control/protection: None   Other Topics Concern    Not on file   Social History Narrative    Not on file     Social Drivers of Health     Financial Resource Strain: Low Risk  (4/16/2025)    Received from [employer]    Overall Financial Resource Strain (CARDIA)     Difficulty of Paying Living Expenses: Not hard at all   Food Insecurity: No Food Insecurity (4/16/2025)    Received from [employer]    Hunger Vital Sign     Worried About Running Out of Food in the Last Year: Never true     Ran Out of Food in the Last Year: Never true   Transportation Needs: No Transportation Needs (4/16/2025)    Received from [employer]    PRAPARE - Transportation     Lack of Transportation (Medical): No     Lack of Transportation (Non-Medical): No   Physical Activity: Insufficiently Active (4/16/2025)    Received from [employer]    Exercise Vital Sign     Days of Exercise per Week: 2 days     Minutes of Exercise per Session: 10 min   Stress: No Stress Concern Present (4/16/2025)    Received from [employer]    [employer] - Occupational Stress Questionnaire     Feeling of Stress : Only a little   Social Connections: Moderately Isolated (4/16/2025)    Received from [employer]    Social Connection and Isolation Panel     Frequency of Communication with Friends and Family: More than three times a week     Frequency of Social Gatherings with Friends and Family: Twice a  week     Attends Congregation Services: Never     Active Member of Clubs or Organizations: No     Attends Club or Organization Meetings: Never     Marital Status:    Intimate Partner Violence: Not At Risk (4/16/2025)    Received from [employer]    Humiliation, Afraid, Rape, and Kick questionnaire     Fear of Current or Ex-Partner: No     Emotionally Abused: No     Physically Abused: No     Sexually Abused: No   Housing Stability: Low Risk  (4/16/2025)    Received from [employer]    Housing Stability Vital Sign     Unable to Pay for Housing in the Last Year: No     Number of Times Moved in the Last Year: 0     Homeless in the Last Year: No      IMMUNIZATION HISTORY     Immunization History   Administered Date(s) Administered    COVID-19, mRNA, LNP-S, PF, 30 mcg/0.3 mL dose 03/04/2021, 03/25/2021, 12/02/2021    Flu vaccine (IIV4), preservative free *Check age/dose* 12/07/2017, 10/15/2018, 10/18/2019    Flu vaccine, quadrivalent, no egg protein, age 6 month or greater (FLUCELVAX) 09/22/2020    Flu vaccine, trivalent, preservative free, age 6 months and greater (Fluarix/Fluzone/Flulaval) 11/15/2016    Influenza, seasonal, injectable 09/29/2015    Pneumococcal conjugate vaccine, 13-valent (PREVNAR 13) 09/29/2015    Pneumococcal polysaccharide vaccine, 23-valent, age 2 years and older (PNEUMOVAX 23) 06/27/2013    Td vaccine, age 7 years and older (TENIVAC) 10/15/1994    Tdap vaccine, age 7 year and older (BOOSTRIX, ADACEL) 06/13/2018    Zoster vaccine, recombinant, adult (SHINGRIX) 09/11/2018, 01/15/2019      REVIEW OF SYSTEMS     Review of Systems   Constitutional:  Negative for appetite change, chills, fever and unexpected weight change.   HENT:  Negative for congestion, rhinorrhea and trouble swallowing.    Respiratory:  Positive for cough (occasional phlegm) and shortness of breath (on exertion). Negative for wheezing.    Cardiovascular:  Negative for chest pain and leg swelling.   Gastrointestinal:  Negative  for diarrhea, nausea and vomiting.   Neurological:  Negative for dizziness, syncope and light-headedness.      CURRENT MEDICATIONS     Current Medications[4]    ALLERGIES     RX Allergies[5]     VITAL SIGNS & WEIGHTS     Vital signs not taken (virtual visit)    Previous Weights:   Wt Readings from Last 3 Encounters:   04/09/25 49.8 kg (109 lb 12.8 oz)   04/08/25 47 kg (103 lb 9.9 oz)   03/01/25 48.1 kg (106 lb)     PHYSICAL EXAM     Limited (virtual visit)   Physical Exam  Constitutional:       General: She is not in acute distress.  Pulmonary:      Effort: No respiratory distress.     Neurological:      Mental Status: She is alert and oriented to person, place, and time.        DIAGNOSTIC DATA     PULMONARY FUNCTION TESTS (PFTs)     PFT 5/2025:  Impression:   -Overall, pulmonary function tests are consistent with essentially normal   findings.   -Isolated reduction in DLCO-consider differentials such as pulmonary   vascular disease, early IDL, emphysema.         IMAGING     PET LUNG INITIALPRO/PI   05/20/2025 02:22 PM   CLINICAL HISTORY: Pulmonary nodule   ASSOCIATED DIAGNOSIS: Pulmonary nodule   ORDERING PROVIDER: KASSI SALEEM   TECHNOLOGISTS NOTE:  Wt= 109 lb Ht= 5'1 Dm= neg Bg= 104 mg/dl Right hand injection   COMPARISON: CT chest 3/1/2025, PET scan 7/10/2024 and CT abdomen and pelvis 11/17/2023   TECHNIQUE: At approximately 1 hour following the intravenous administration of F18 FDG, whole body PET/CT imaging was performed. This imaging consisted of unenhanced low dose spiral axial 3.75 mm collimated CT imaging from the base of the skull  through the thighs which was utilized for PET attenuation correction as well as anatomic localization. This was followed by full ring positron emission tomography (PET).   INTRA-PROCEDURE MEDS: fludeoxyglucose F18 (FDG)  MCI/ML solution 17.4 millicurie Route: Intravenous     FINDINGS:   Mediastinal blood pool SUV mean: 1.9   Liver background SUV mean: 2.2      HEAD/NECK:     No areas of abnormal FDG avidity are seen. No neck mass or adenopathy is noted. Atheromatous calcification of the carotid bifurcations..     CHEST:     Interval increase in size and density of the spiculated left upper lobe pulmonary nodule when compared to PET scan from 7/10/2024, with increased FDG avidity (SUV max 6.8, previously SUV max 2.9).     There is also increased hypermetabolism within a previously seen left hilar node (SUV max 4.6, previously SUV max 2.7). Additionally, there is unchanged mild hypermetabolism within a right lower paratracheal lymph node (SUV max 2.9).   Additional numerous mixed composition pulmonary nodules measuring up to 8 mm in the right lower lobe are grossly unchanged from 3/1/2025 CT chest and non-FDG avid, although below PET detection threshold.     Mixed paraseptal and severe centrilobular emphysema. Dense triple vessel coronary artery and aortic valvular calcification. Unchanged mild dilation of the descending aorta, measuring up to 4.1 cm.     ABDOMEN/PELVIS:     No areas of abnormal FDG avidity are seen. .     Splenic granulomas. Diverticulosis. Extensive atherosclerotic disease of the abdominal aorta and its branches.     MUSCULOSKELETAL:     No areas of abnormal FDG avidity are seen. Grade 1 anterolisthesis of L4 on L5. Multilevel degenerative changes and diffuse idiopathic skeletal hyperostosis of the spine. No destructive osseous lesion is noted. .     IMPRESSION:   1. Increased FDG avidity within the left upper lobe pulmonary nodule and left hilar lymph node, consistent with primary lung malignancy associated with lexie metastasis to the left hilum.   2.  Stable mildly hypermetabolic right lower paratracheal mediastinal lymph node is unchanged and equivocal, and may be reactive or metastatic. Transbronchial biopsy should be considered for definitive characterization.   3.  Indeterminate bilateral, mixed density pulmonary nodules measuring up to 8 mm  are stable, although below PET detection threshold.   4.  No evidence of distant metastatic disease.     CT ANGIO CHEST FOR PULMONARY EMBOLISM; CT ANGIO ABDOMEN PELVIS W  AND/OR WO IV IV CONTRAST;  3/1/2025 12:18 pm; 3/1/2025 12:17 pm      INDICATION:  Signs/Symptoms:concern for PE/pneumonia; Signs/Symptoms:cp, possibly  aortic pathology see on ct angio chest.      COMPARISON:  None.      ACCESSION NUMBER(S):  [id number]; [id number]      ORDERING CLINICIAN:  MARK DOWD      TECHNIQUE:  CT of the chest, abdomen, and pelvis was performed.  Contiguous axial  images were obtained at 3 mm slice thickness through the chest,  abdomen and pelvis. Coronal and sagittal reconstructions at 3mm slice  thickness were performed.  75 mL Omnipaque 350 administered intravenously without immediate  complication. MIP reformatted images were generated.      FINDINGS:  CHEST:      LUNG/PLEURA/LARGE AIRWAYS:  Severe emphysematous changes are present.  There is a spiculated nodule in the left upper lobe anteriorly  measuring 12 mm. A few nodules in the right lower lobe appears linear  on orthogonal planes suggesting scars for example measuring 0.7 cm  (Series 406, Image 190) and 0.5 cm (Series 406, Image 220). Calcified  granulomas in the right lower lobe are also noted. Otherwise the  lungs appear clear. No pleural effusion or pneumothorax  Central airways are patent. There are areas endobronchial mucous  plugging involving both lower lobes.      VESSELS:  No pulmonary embolism identified.  Severe coronary atherosclerosis.  Severe thoracic aortic atherosclerosis. Ascending aorta is not  significantly dilated measuring 34 mm. There is extensive mixing  artifact throughout the descending thoracic aorta which is more  dilated than the ascending aorta. The proximal descending aorta  measures 36 mm and more distally there is a fusiform aneurysm  measuring 41 mm. Aneurysm does not extend below the diaphragmatic  hiatus.      The thoracic  great vessels are tortuous but otherwise patent.      There is severe abdominal aortic atherosclerosis without aneurysm.  The abdominal aorta is diffusely ectatic measuring up to 29 mm  proximally. The celiac, superior mesenteric, right renal, left renal,  and inferior mesenteric arteries are patent.      There is severe atherosclerosis of the bilateral common, internal,  and external iliac arteries which are otherwise patent without  aneurysm.      HEART:  Mild cardiomegaly. No significant pericardial effusion.      MEDIASTINUM AND MONSERRAT:  No lymphadenopathy.  The esophagus is not dilated.      CHEST WALL AND LOWER NECK:  No lymphadenopathy.  The visualized thyroid is grossly unremarkable.      ABDOMEN:      LIVER:  A few scattered calcified granulomas.      BILE DUCTS:  Not dilated.      GALLBLADDER:  No calcified stones or definite inflammation.      PANCREAS:  Unremarkable.      SPLEEN:  Scattered calcified granulomas.      ADRENAL GLANDS:  Unremarkable.      KIDNEYS AND URETERS:  Normal in cortical thickness without hydronephrosis.   No focal renal  lesions.      PELVIS:      BLADDER:  Partially distended.      REPRODUCTIVE ORGANS:  Uterus is present.      BOWEL:  No findings of bowel inflammation or obstruction.   Unremarkable  appendix.   Unremarkable mesentery.          VESSELS:  See above.  Major portal venous branches grossly appear patent.  IVC and visualized major branches are patent.      PERITONEUM/RETROPERITONEUM/LYMPH NODES:  No free fluid or free air.  No abdominal or pelvic lymphadenopathy.      BONE AND SOFT TISSUE:  No focal suspicious skeletal lesions.   Degenerative changes of the  spine. Grade 1 anterolisthesis at L4-5.          IMPRESSION:  CHEST:  1.  12 mm spiculated nodule in the left upper lobe, highly suspicious  for primary neoplasm, especially in a patient with evidence of severe  pulmonary emphysema. Suggest nonemergent referral for tissue  sampling. No findings of metastatic disease  of the thorax otherwise  identified.  2. No pulmonary embolism identified.  3. No acute aortic pathology. There is extensive mixing artifact  related to turbulent flow within the descending thoracic aorta,  relating to diffuse dilatation compared to the ascending aorta with  distal thoracic descending aortic aneurysm up to 4.1 cm in diameter.  Otherwise no evidence of aortic dissection or other acute pathology  identified.  4. Extensive endobronchial mucous plugging in the lower lobes.  Otherwise no focal pulmonary infiltrates identified.      ABDOMEN-PELVIS:  1.  No definite acute findings.  2. Severe atherosclerosis with diffuse ectasia of the abdominal aorta  up to 2.9 cm.    RECENT LABS      Latest Reference Range & Units 06/30/25 12:53   WHITE BLOOD CELL COUNT 3.8 - 10.8 Thousand/uL 10.8   RED BLOOD CELL COUNT 3.80 - 5.10 Million/uL 4.90   HEMOGLOBIN 11.7 - 15.5 g/dL 14.9   HEMATOCRIT 35.0 - 45.0 % 45.4 (H)   MCV 80.0 - 100.0 fL 92.7   MCH 27.0 - 33.0 pg 30.4   MCHC 32.0 - 36.0 g/dL 32.8   RDW 11.0 - 15.0 % 13.0   PLATELET COUNT 140 - 400 Thousand/uL 382   MPV 7.5 - 12.5 fL 9.5   (H): Data is abnormally high     Latest Reference Range & Units 06/30/25 12:53   GLUCOSE 65 - 99 mg/dL 90   SODIUM 135 - 146 mmol/L 138   POTASSIUM 3.5 - 5.3 mmol/L 4.6   CHLORIDE 98 - 110 mmol/L 101   CARBON DIOXIDE 20 - 32 mmol/L 27   ELECTROLYTE BALANCE 7 - 17 mmol/L (calc) 10   UREA NITROGEN (BUN) 7 - 25 mg/dL 12   CREATININE 0.60 - 1.00 mg/dL 0.80   EGFR > OR = 60 mL/min/1.73m2 76   BUN/CREATININE RATIO 6 - 22 (calc) SEE NOTE:   CALCIUM 8.6 - 10.4 mg/dL 9.7     ASSESSMENT & PLAN     Radha Logan is a 79 y.o. female with a past medical history of COPD, nicotine dependence, CAD, NSTEMI, hypertension, hyperlipidemia, aneurysm of descending thoracic aorta, chronic sinusitis, anxiety who presents virtually to [facility] Pul[facility] for a pre-bronchoscpy evaluation.     Left upper lobe lung nodule  She had been  hospitalized 2/2025 for pneumonia and ultimately left AMA; she was provided Levaquin on release but had only taken it for 1 day due to nausea and vomiting. She was hospitalized again 3/2025 for COPD exacerbation and acute hypoxic respiratory failure, as well as concern for pneumonia. CTA chest was completed at this time which was negative for PE but otherwise showed a 12 mm spiculated nodule in the left upper lobe and severe pulmonary emphysema, as well as extensive endobronchial mucous plugging in the lower lobes. Plan was for outpatient thoracic surgery follow-up. Noted that she was hospitalized again 4/2025 for COPD exacerbation and increased oxygen requirements. She subsequently saw Dr. Jamison on 4/9/2025 and plan was to proceed with bronchoscopy. Patient had then chosen to pursue her care/bronchoscopy through [facility]. Bronchoscopy was completed on 6/4/2025, however there was concern for inadequate/nondiagnostic tissue sampling. She has now been referred back to  for navigational bronchoscopy. She is now scheduled for bronchoscopy 7/17/2025 with airway exam, BAL, TB NA, TBBx, navigational bronchoscopy, radial EBUS, and staging EBUS.  Plan to proceed with bronchoscopy as scheduled on 7/17/2025.  Obtain CT chest for navigational bronch planning prior to procedure.  No need for updated labs/labs up to date.   Patient to be NPO after midnight the night before bronchoscopy.  Patient confirmed that she is not on Lovenox or any other blood thinner.    COPD/emphysema  Nicotine dependence  Chronic hypoxic respiratory failure  Patient reports history of COPD. PFT 5/2025 did not show obstruction, although emphysematous changes noted on recent CT chest imaging; significant smoking history as well. Currently using Anoro and albuterol HFA as needed. Currently uses supplemental oxygen at 3 L as needed during the day. Established with [facility] pulmonary.  Continue current bronchodilator regimen.  Continue supplemental  oxygen as needed.  Smoking cessation encouraged.  Follow-up with her pulmonologist as directed.    Virtual Visit Consent:  A video visit  between the patient (located at home) and provider (located at remote office in [place], [place]) was utilized to provide this telehealth service.  Prep Time: 20 minutes  Video Time: 16 minutes  Total Time Spent: 36 minutes    I explained the bronchoscopy procedure to the patient. We discussed that the bronchoscopy will be performed by a member of the Interventional Pulmonary Team, depending on scheduling and provider availability. We also discussed that the IP providers function as a team and not infrequently may have to fill in for one another if there are emergent issues that need attention at the same time. The patient expressed understanding and agreed to proceed. All questions were answered.      Hollie Brambila, APRN-CNP  2025    Thank you for visit with the  Pulmonary Clinic.  Pulmonary Office: [phone]  Bronchoscopy Scheduling: Yudi - [phone] / Jay - [phone]   Radiology Scheduling: [phone]   General Appointment Scheduling: [phone]   Pulmonary Function Testing: [phone]            [1]   Past Medical History:  Diagnosis Date    Clotting disorder (Multi)     COPD (chronic obstructive pulmonary disease) (Multi)     Hypertension     Lung nodule     Myocardial infarction (Multi)     Vertigo 2009   [2]   Past Surgical History:  Procedure Laterality Date    BRONCHOSCOPY  2025     SECTION, CLASSIC      LUNG BIOPSY  2025   [3]   Family History  Problem Relation Name Age of Onset    Brain cancer Sister      Lung cancer Sister      Lung cancer Niece      Cancer Sister Caroline Head     Heart disease Father Miguel Head     Epilepsy Sister Caroline Head 10 - 19    Hypertension Mother Kae Jim    [4]   Current Outpatient Medications   Medication Sig Dispense Refill    albuterol 90 mcg/actuation inhaler Inhale  2 puffs every 4 hours if needed for wheezing or shortness of breath.      amLODIPine (Norvasc) 10 mg tablet Take 1 tablet (10 mg) by mouth once daily. 30 tablet 0    Anoro Ellipta 62.5-25 mcg/actuation blister with device Inhale 1 puff once daily.      aspirin 81 mg EC tablet Take 1 tablet (81 mg) by mouth once daily.      atorvastatin (Lipitor) 80 mg tablet Take 1 tablet (80 mg) by mouth once daily.      benzonatate (Tessalon) 200 mg capsule Take 1 capsule (200 mg) by mouth 3 times a day as needed for cough. 20 capsule 0    escitalopram (Lexapro) 10 mg tablet Take 1 tablet (10 mg) by mouth once daily.      gabapentin (Neurontin) 300 mg capsule Take 1 capsule (300 mg) by mouth 2 times a day.      guaiFENesin (Mucinex) 1,200 mg tablet extended release 12hr Take 1 tablet (1,200 mg) by mouth 2 times a day. Do not crush, chew, or split. (Patient not taking: Reported on 4/9/2025) 30 tablet 0    hydrALAZINE (Apresoline) 50 mg tablet Take 1 tablet (50 mg) by mouth 2 times a day. 60 tablet 0    hydrOXYzine HCL (Atarax) 25 mg tablet Take 1 tablet (25 mg) by mouth every 8 hours if needed for anxiety. 30 tablet 0    lisinopril 40 mg tablet Take 1 tablet (40 mg) by mouth once daily.      metoprolol succinate XL (Toprol-XL) 50 mg 24 hr tablet Take 1 tablet (50 mg) by mouth once daily.      nicotine (Nicoderm CQ) 14 mg/24 hr patch Place 1 patch over 24 hours on the skin once daily. 30 patch 0    ondansetron (Zofran) 4 mg tablet Take 1 tablet (4 mg) by mouth every 8 hours if needed for nausea or vomiting. 20 tablet 0    oxygen (O2) gas therapy Inhale 1 each every 12 hours.      traMADol (Ultram) 50 mg tablet Take 1 tablet (50 mg) by mouth every 6 hours if needed.      traZODone (Desyrel) 50 mg tablet Take 2 tablets (100 mg) by mouth once daily at bedtime.       No current facility-administered medications for this visit.   [5]   Allergies  Allergen Reactions    Other Unknown     Macrolides    Tetracycline Nausea/vomiting     Azithromycin Nausea Only    Codeine Itching    Doxycycline Nausea Only    Levaquin [Levofloxacin] Nausea/vomiting    Penicillins Nausea/vomiting     vomiting

## 2025-07-17 ENCOUNTER — ANESTHESIA EVENT (OUTPATIENT)
Dept: GASTROENTEROLOGY | Facility: HOSPITAL | Age: 80
End: 2025-07-17
Payer: MEDICARE

## 2025-07-17 ENCOUNTER — HOSPITAL ENCOUNTER (OUTPATIENT)
Dept: GASTROENTEROLOGY | Facility: HOSPITAL | Age: 80
Discharge: HOME | End: 2025-07-17
Payer: MEDICARE

## 2025-07-17 ENCOUNTER — ANESTHESIA (OUTPATIENT)
Dept: GASTROENTEROLOGY | Facility: HOSPITAL | Age: 80
End: 2025-07-17
Payer: MEDICARE

## 2025-07-17 ENCOUNTER — HOSPITAL ENCOUNTER (OUTPATIENT)
Dept: RADIOLOGY | Facility: HOSPITAL | Age: 80
Discharge: HOME | End: 2025-07-17
Payer: MEDICARE

## 2025-07-17 VITALS
TEMPERATURE: 98.2 F | OXYGEN SATURATION: 94 % | SYSTOLIC BLOOD PRESSURE: 123 MMHG | HEIGHT: 61 IN | DIASTOLIC BLOOD PRESSURE: 55 MMHG | RESPIRATION RATE: 17 BRPM | BODY MASS INDEX: 20.96 KG/M2 | WEIGHT: 111 LBS | HEART RATE: 57 BPM

## 2025-07-17 DIAGNOSIS — R91.1 LEFT UPPER LOBE PULMONARY NODULE: ICD-10-CM

## 2025-07-17 PROCEDURE — 31653 BRONCH EBUS SAMPLNG 3/> NODE: CPT | Performed by: INTERNAL MEDICINE

## 2025-07-17 PROCEDURE — 3700000002 HC GENERAL ANESTHESIA TIME - EACH INCREMENTAL 1 MINUTE

## 2025-07-17 PROCEDURE — 7100000002 HC RECOVERY ROOM TIME - EACH INCREMENTAL 1 MINUTE

## 2025-07-17 PROCEDURE — 7100000010 HC PHASE TWO TIME - EACH INCREMENTAL 1 MINUTE

## 2025-07-17 PROCEDURE — 71250 CT THORAX DX C-: CPT | Performed by: RADIOLOGY

## 2025-07-17 PROCEDURE — 31627 NAVIGATIONAL BRONCHOSCOPY: CPT | Performed by: INTERNAL MEDICINE

## 2025-07-17 PROCEDURE — 31623 DX BRONCHOSCOPE/BRUSH: CPT | Performed by: INTERNAL MEDICINE

## 2025-07-17 PROCEDURE — 3700000001 HC GENERAL ANESTHESIA TIME - INITIAL BASE CHARGE

## 2025-07-17 PROCEDURE — 31629 BRONCHOSCOPY/NEEDLE BX EACH: CPT | Performed by: INTERNAL MEDICINE

## 2025-07-17 PROCEDURE — C1601 HC OR 272 NO HCPCS: HCPCS

## 2025-07-17 PROCEDURE — P9045 ALBUMIN (HUMAN), 5%, 250 ML: HCPCS | Mod: JZ,TB | Performed by: NURSE ANESTHETIST, CERTIFIED REGISTERED

## 2025-07-17 PROCEDURE — 31654 BRONCH EBUS IVNTJ PERPH LES: CPT | Performed by: INTERNAL MEDICINE

## 2025-07-17 PROCEDURE — 2500000005 HC RX 250 GENERAL PHARMACY W/O HCPCS: Performed by: NURSE ANESTHETIST, CERTIFIED REGISTERED

## 2025-07-17 PROCEDURE — 2500000004 HC RX 250 GENERAL PHARMACY W/ HCPCS (ALT 636 FOR OP/ED): Mod: JZ,TB | Performed by: NURSE ANESTHETIST, CERTIFIED REGISTERED

## 2025-07-17 PROCEDURE — 31622 DX BRONCHOSCOPE/WASH: CPT | Performed by: INTERNAL MEDICINE

## 2025-07-17 PROCEDURE — 7100000001 HC RECOVERY ROOM TIME - INITIAL BASE CHARGE

## 2025-07-17 PROCEDURE — 71250 CT THORAX DX C-: CPT

## 2025-07-17 PROCEDURE — 2720000007 HC OR 272 NO HCPCS

## 2025-07-17 PROCEDURE — 7100000009 HC PHASE TWO TIME - INITIAL BASE CHARGE

## 2025-07-17 PROCEDURE — 31628 BRONCHOSCOPY/LUNG BX EACH: CPT | Performed by: INTERNAL MEDICINE

## 2025-07-17 RX ORDER — FENTANYL CITRATE 50 UG/ML
INJECTION, SOLUTION INTRAMUSCULAR; INTRAVENOUS AS NEEDED
Status: DISCONTINUED | OUTPATIENT
Start: 2025-07-17 | End: 2025-07-17

## 2025-07-17 RX ORDER — PHENYLEPHRINE 10 MG/250 ML(40 MCG/ML)IN 0.9 % SOD.CHLORIDE INTRAVENOUS
CONTINUOUS PRN
Status: DISCONTINUED | OUTPATIENT
Start: 2025-07-17 | End: 2025-07-17

## 2025-07-17 RX ORDER — PROPOFOL 10 MG/ML
INJECTION, EMULSION INTRAVENOUS AS NEEDED
Status: DISCONTINUED | OUTPATIENT
Start: 2025-07-17 | End: 2025-07-17

## 2025-07-17 RX ORDER — OXYCODONE HYDROCHLORIDE 5 MG/1
5 TABLET ORAL EVERY 4 HOURS PRN
Refills: 0 | OUTPATIENT
Start: 2025-07-17

## 2025-07-17 RX ORDER — HYDROMORPHONE HYDROCHLORIDE 1 MG/ML
0.2 INJECTION, SOLUTION INTRAMUSCULAR; INTRAVENOUS; SUBCUTANEOUS EVERY 5 MIN PRN
OUTPATIENT
Start: 2025-07-17

## 2025-07-17 RX ORDER — HYDROMORPHONE HYDROCHLORIDE 1 MG/ML
0.5 INJECTION, SOLUTION INTRAMUSCULAR; INTRAVENOUS; SUBCUTANEOUS EVERY 5 MIN PRN
OUTPATIENT
Start: 2025-07-17

## 2025-07-17 RX ORDER — SODIUM CHLORIDE, SODIUM LACTATE, POTASSIUM CHLORIDE, CALCIUM CHLORIDE 600; 310; 30; 20 MG/100ML; MG/100ML; MG/100ML; MG/100ML
100 INJECTION, SOLUTION INTRAVENOUS CONTINUOUS
OUTPATIENT
Start: 2025-07-17 | End: 2025-07-25

## 2025-07-17 RX ORDER — GLYCOPYRROLATE 0.2 MG/ML
INJECTION INTRAMUSCULAR; INTRAVENOUS AS NEEDED
Status: DISCONTINUED | OUTPATIENT
Start: 2025-07-17 | End: 2025-07-17

## 2025-07-17 RX ORDER — LIDOCAINE IN NACL,ISO-OSMOT/PF 30 MG/3 ML
0.1 SYRINGE (ML) INJECTION ONCE
OUTPATIENT
Start: 2025-07-17 | End: 2025-07-17

## 2025-07-17 RX ORDER — ONDANSETRON HYDROCHLORIDE 2 MG/ML
INJECTION, SOLUTION INTRAVENOUS AS NEEDED
Status: DISCONTINUED | OUTPATIENT
Start: 2025-07-17 | End: 2025-07-17

## 2025-07-17 RX ORDER — ONDANSETRON HYDROCHLORIDE 2 MG/ML
4 INJECTION, SOLUTION INTRAVENOUS ONCE AS NEEDED
OUTPATIENT
Start: 2025-07-17

## 2025-07-17 RX ORDER — ALBUMIN HUMAN 50 G/1000ML
SOLUTION INTRAVENOUS AS NEEDED
Status: DISCONTINUED | OUTPATIENT
Start: 2025-07-17 | End: 2025-07-17

## 2025-07-17 RX ORDER — ROCURONIUM BROMIDE 10 MG/ML
INJECTION, SOLUTION INTRAVENOUS AS NEEDED
Status: DISCONTINUED | OUTPATIENT
Start: 2025-07-17 | End: 2025-07-17

## 2025-07-17 RX ORDER — PHENYLEPHRINE HCL IN 0.9% NACL 0.4MG/10ML
SYRINGE (ML) INTRAVENOUS AS NEEDED
Status: DISCONTINUED | OUTPATIENT
Start: 2025-07-17 | End: 2025-07-17

## 2025-07-17 RX ORDER — NORETHINDRONE AND ETHINYL ESTRADIOL 0.5-0.035
KIT ORAL AS NEEDED
Status: DISCONTINUED | OUTPATIENT
Start: 2025-07-17 | End: 2025-07-17

## 2025-07-17 RX ORDER — LIDOCAINE HCL/PF 100 MG/5ML
SYRINGE (ML) INTRAVENOUS AS NEEDED
Status: DISCONTINUED | OUTPATIENT
Start: 2025-07-17 | End: 2025-07-17

## 2025-07-17 RX ADMIN — Medication 120 MCG: at 13:14

## 2025-07-17 RX ADMIN — SODIUM CHLORIDE, SODIUM LACTATE, POTASSIUM CHLORIDE, AND CALCIUM CHLORIDE: 600; 310; 30; 20 INJECTION, SOLUTION INTRAVENOUS at 12:49

## 2025-07-17 RX ADMIN — PROPOFOL 150 MG: 10 INJECTION, EMULSION INTRAVENOUS at 12:55

## 2025-07-17 RX ADMIN — PHENYLEPHRINE-NACL IV SOLUTION 10 MG/250ML-0.9% 0.2 MCG/KG/MIN: 10-0.9/25 SOLUTION at 14:02

## 2025-07-17 RX ADMIN — Medication 120 MCG: at 13:29

## 2025-07-17 RX ADMIN — EPHEDRINE SULFATE 5 MG: 50 INJECTION INTRAVENOUS at 13:52

## 2025-07-17 RX ADMIN — ALBUMIN HUMAN 250 ML: 0.05 INJECTION, SOLUTION INTRAVENOUS at 13:12

## 2025-07-17 RX ADMIN — ROCURONIUM BROMIDE 10 MG: 10 INJECTION INTRAVENOUS at 13:43

## 2025-07-17 RX ADMIN — SUGAMMADEX 200 MG: 100 INJECTION, SOLUTION INTRAVENOUS at 14:50

## 2025-07-17 RX ADMIN — Medication 80 MCG: at 13:13

## 2025-07-17 RX ADMIN — FENTANYL CITRATE 25 MCG: 50 INJECTION, SOLUTION INTRAMUSCULAR; INTRAVENOUS at 14:29

## 2025-07-17 RX ADMIN — FENTANYL CITRATE 25 MCG: 50 INJECTION, SOLUTION INTRAMUSCULAR; INTRAVENOUS at 14:13

## 2025-07-17 RX ADMIN — GLYCOPYRROLATE 0.3 MG: 0.2 INJECTION INTRAMUSCULAR; INTRAVENOUS at 13:11

## 2025-07-17 RX ADMIN — PROPOFOL 50 MG: 10 INJECTION, EMULSION INTRAVENOUS at 14:42

## 2025-07-17 RX ADMIN — EPHEDRINE SULFATE 5 MG: 50 INJECTION INTRAVENOUS at 13:19

## 2025-07-17 RX ADMIN — ONDANSETRON 4 MG: 2 INJECTION INTRAMUSCULAR; INTRAVENOUS at 14:44

## 2025-07-17 RX ADMIN — Medication 200 MCG: at 13:59

## 2025-07-17 RX ADMIN — ROCURONIUM BROMIDE 50 MG: 10 INJECTION INTRAVENOUS at 12:55

## 2025-07-17 RX ADMIN — FENTANYL CITRATE 50 MCG: 50 INJECTION, SOLUTION INTRAMUSCULAR; INTRAVENOUS at 12:54

## 2025-07-17 RX ADMIN — LIDOCAINE HYDROCHLORIDE 60 MG: 20 INJECTION INTRAVENOUS at 12:54

## 2025-07-17 RX ADMIN — PROPOFOL 100 MCG/KG/MIN: 10 INJECTION, EMULSION INTRAVENOUS at 12:58

## 2025-07-17 RX ADMIN — Medication 80 MCG: at 13:32

## 2025-07-17 RX ADMIN — PROPOFOL 50 MG: 10 INJECTION, EMULSION INTRAVENOUS at 13:40

## 2025-07-17 SDOH — HEALTH STABILITY: MENTAL HEALTH: CURRENT SMOKER: 0

## 2025-07-17 ASSESSMENT — PAIN - FUNCTIONAL ASSESSMENT
PAIN_FUNCTIONAL_ASSESSMENT: 0-10

## 2025-07-17 ASSESSMENT — COLUMBIA-SUICIDE SEVERITY RATING SCALE - C-SSRS
2. HAVE YOU ACTUALLY HAD ANY THOUGHTS OF KILLING YOURSELF?: NO
6. HAVE YOU EVER DONE ANYTHING, STARTED TO DO ANYTHING, OR PREPARED TO DO ANYTHING TO END YOUR LIFE?: NO
1. IN THE PAST MONTH, HAVE YOU WISHED YOU WERE DEAD OR WISHED YOU COULD GO TO SLEEP AND NOT WAKE UP?: NO

## 2025-07-17 ASSESSMENT — PAIN SCALES - GENERAL
PAINLEVEL_OUTOF10: 0 - NO PAIN
PAIN_LEVEL: 0
PAINLEVEL_OUTOF10: 0 - NO PAIN

## 2025-07-17 NOTE — LETTER
TamekarRadha       7/8/2025                                                                                               INFORMATION FOR YOUR PROCEDURE    INSTRUCTIONS MUST BE FOLLOWED OR YOU RUN THE RISK OF YOUR CASE BEING CANCELED    Information is attached to this e-mail for your upcoming procedure. (Look for the paperclip in your email to access this information)  Lab requisitions (if needed), are also included as well as procedural instructions.       You are scheduled for your Bronchoscopy on  7/17/25 , with Dr. Jessica Acuna   [facility] [address] [place] [address]    9:30 AM    - CT  Scan  [facility]   2nd Floor Radiology  [address]    When finished with the CT scan,   please make your way to [facility] Room 1300.  This is right around the corner from [facility].  Located on the first floor.  There are information desks there for your convenience and if you have questions.    Check In will be at  10:30  AM.  This allows us to prepare you for the actual procedure.                                        Bronchoscopy   Location:  [address] [facility]                                         Bronchoscopy / Endoscopy Suite    NPO (No food or drink) after midnight the night before your procedure. This includes coffee, water, and soda, hard candy, gum or mints.  If taking your morning medications, a small sip of water is allowed to get the medication down.    For your safety, you must have a responsible, adult  accompany you to your procedure.  You will not be permitted to drive yourself home if you have received any type of anesthesia or sedation.    Automated calls about your upcoming scheduled appointments can be quite confusing for patients.    The times may vary depending on what you have scheduled for procedure day. Follow the time/s I have given you on your information sheet so there is no confusion.  Be aware you may receive  conflicting times from different departments.      Blood work will need to be done prior to your procedure, preferably at a  Facility.  There are no restrictions for testing.  Your blood work is current.  No need to repeat.    Our Nurse Practitioner, Hollie Brambila CNP, will call you on 7/16/25, @ 9:20 AM    This is a phone visit to go over your medical history prior to your procedure, and is a necessary part of your medical workup.  The patient must be present at this visit.    Please reach out to me with any questions you may have  Yudi  [phone] or Jay @ [phone]    If you have an emergency (weather or other) on the day of your scheduled procedure and need to cancel for any reason, Please call the Endoscopy Suite @ [phone],  Otherwise, call Yudi @ [phone]      Have a nice day!    Yudi Johnson    Bronchoscopy   Interventional Pulmonology    MD Rex Ardon MD Sameer Avasarala, MD Catalina Teba, MD Andrew Dunatchik, MD Sruti Brahmandam, MD      [facility]  Pulmonary, Critical Care and Sleep Medicine  [address] [place] [address]  P -[phone]  - [phone]  [email or web address]

## 2025-07-17 NOTE — ANESTHESIA PROCEDURE NOTES
Airway  Date/Time: 7/17/2025 12:58 PM  Reason: elective    Airway not difficult    Staffing  Performed: CRNA   Authorized by: Andrews Florian MD    Performed by: LISSA Mcginnis-SALINAS  Patient location during procedure: OR    Patient Condition  Indications for airway management: anesthesia  Patient position: sniffing  Sedation level: deep     Final Airway Details   Preoxygenated: yes  Final airway type: endotracheal airway  Successful airway: ETT  Cuffed: yes   Successful intubation technique: direct laryngoscopy  Adjuncts used in placement: intubating stylet  Endotracheal tube insertion site: oral  Blade: Marianna  Blade size: #3  ETT size (mm): 8.5  Cormack-Lehane Classification: grade I - full view of glottis  Placement verified by: bronchoscopy and capnometry   Measured from: lips  Number of attempts at approach: 1

## 2025-07-17 NOTE — DISCHARGE INSTRUCTIONS
The anesthetics, sedatives or narcotics which were given to you today will be acting in your body for the next 24 hours, so you might feel a little sleepy or groggy. This feeling should slowly wear off.   Carefully read and follow the instructions below:   You received sedation today.   Do not drive or operate machinery or power tools of any kind.   No alcoholic beverages today, not even beer or wine.   No over the counter medications that contain alcohol or may cause drowsiness.   Do not make important decisions or sign legal documents.     Do not use Aspirin containing products or non-steroidal medications for the next 24 hours.  (Examples of these types of medications include: Advil, Aleve, Ecotrin, Ibuprofen, Motrin or Naprosyn.  This list is not all-inclusive.  Check with your physician or pharmacist before resuming these medications.   Tylenol, cough medicine, cough drops or throat lozenges may be used when you are allowed to resume eating and drinking.     Call your physician if any of these symptoms occur:   High fever over 101 degrees or chills (a low grade fever is common for 24 hours)   Rash or hives   Persistent nausea or vomiting   Inability to urinate within 8 hours after the procedure    Go directly to the emergency room if you notice any of the following:   Shortness of breath   Chest pain              Coughing up large amounts of bright red blood greater than a teaspoonful of blood clots (about a teaspoonful for the next 24-48 hours is normal, especially if you had a biopsy)    Resume all normal medications unless directed otherwise by your doctor.     Your doctor recommends these additional instructions:    Follow up with your referring physician as previously scheduled.    If you experience any problems or have any questions following discharge, please call:   Before 5 pm: [phone]   After 5pm and on weekends: [phone] / [phone] and ask for the Pulmonary Fellow on-call (Pager  Number: [phone])

## 2025-07-17 NOTE — ANESTHESIA POSTPROCEDURE EVALUATION
Patient: Radha Logan    Procedure Summary       Date: 07/17/25 Room / Location: [facility]    Anesthesia Start: 1242 Anesthesia Stop: 1508    Procedure: BRONCHOSCOPY Diagnosis: Left upper lobe pulmonary nodule    Scheduled Providers: Jessica DOLAN MD; Leno Bullock RN Responsible Provider: Rachelle Hatch MD    Anesthesia Type: general ASA Status: 3            Anesthesia Type: general    Vitals Value Taken Time   /65 07/17/25 15:35   Temp 36.8 °C (98.2 °F) 07/17/25 15:05   Pulse 58 07/17/25 15:35   Resp 13 07/17/25 15:35   SpO2 92 % 07/17/25 15:35       Anesthesia Post Evaluation    Patient location during evaluation: PACU  Patient participation: complete - patient participated  Level of consciousness: awake and alert  Pain score: 0  Pain management: adequate  Airway patency: patent  Cardiovascular status: acceptable  Respiratory status: nasal cannula  Hydration status: acceptable  Postoperative Nausea and Vomiting: none        No notable events documented.

## 2025-07-17 NOTE — ANESTHESIA PREPROCEDURE EVALUATION
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   (+) Benign essential HTN   (+) Coronary artery disease involving native coronary artery of native heart without angina pectoris   (+) Hyperlipidemia   (+) RBBB      Pulmonary   (+) COPD exacerbation (Multi)   (+) Pneumonia      Neuro   (+) VLAD (generalized anxiety disorder)   (+) Lumbar radiculopathy      Endocrine   (+) Subclinical hyperthyroidism      Musculoskeletal   (+) Spinal stenosis of lumbar region      HEENT   (+) Chronic sinusitis   (+) SNHL (sensorineural hearing loss)      ID   (+) Pneumonia       Clinical information reviewed:   Tobacco  Allergies  Meds   Med Hx  Surg Hx   Fam Hx  Soc Hx        NPO Detail:  NPO/Void Status  Carbohydrate Drink Given Prior to Surgery? : N  Date of Last Liquid: 07/16/25  Time of Last Liquid: 2000  Date of Last Solid: 07/16/25  Time of Last Solid: 2000  Last Intake Type: Clear fluids  Time of Last Void: 1006         Physical Exam    Airway  Mallampati: I  TM distance: >3 FB  Neck ROM: full     Cardiovascular - normal exam   Dental - normal exam     Pulmonary - normal exam   Abdominal - normal exam           Anesthesia Plan    History of general anesthesia?: yes  History of complications of general anesthesia?: no    ASA 3     general     The patient is not a current smoker.  Patient was not previously instructed to abstain from smoking on day of procedure.  Patient did not smoke on day of procedure.    intravenous induction   Postoperative pain plan includes opioids.  Anesthetic plan and risks discussed with patient.  Use of blood products discussed with patient who.    Plan discussed with CRNA.

## 2025-07-17 NOTE — INTERVAL H&P NOTE
H&P reviewed. The patient was examined and there are no changes to the H&P.  On exam, RRR no m/r/g, No LAD, CTAB, pleasant appearing.  Discussed further efforts toward complete smoking cessation.  Consent reviewed, question answered, and consent obtained.  Proceed with bronchoscopy.

## 2025-07-18 ASSESSMENT — PAIN SCALES - GENERAL: PAINLEVEL_OUTOF10: 10 - WORST POSSIBLE PAIN

## 2025-07-21 LAB
LAB AP ASR DISCLAIMER: NORMAL
LABORATORY COMMENT REPORT: NORMAL
LABORATORY COMMENT REPORT: NORMAL
PATH REPORT.FINAL DX SPEC: NORMAL
PATH REPORT.GROSS SPEC: NORMAL
PATH REPORT.INTRAOP OBS SPEC DOC: NORMAL
PATH REPORT.RELEVANT HX SPEC: NORMAL
PATH REPORT.TOTAL CANCER: NORMAL

## 2025-07-22 ENCOUNTER — TELEPHONE (OUTPATIENT)
Dept: CARDIOTHORACIC SURGERY | Facility: HOSPITAL | Age: 80
End: 2025-07-22
Payer: MEDICARE

## 2025-07-22 NOTE — TELEPHONE ENCOUNTER
I called patient today to make an appt for her with Dr. Jamison.  She stated she is following up with her doctors at [facility] now and not [facility]>

## 2025-07-23 ENCOUNTER — RESULTS FOLLOW-UP (OUTPATIENT)
Dept: PRIMARY CARE | Facility: CLINIC | Age: 80
End: 2025-07-23
Payer: MEDICARE

## 2025-07-23 LAB
LAB AP ASR DISCLAIMER: NORMAL
LABORATORY COMMENT REPORT: NORMAL
PATH REPORT.COMMENTS IMP SPEC: NORMAL
PATH REPORT.FINAL DX SPEC: NORMAL
PATH REPORT.GROSS SPEC: NORMAL
PATH REPORT.TOTAL CANCER: NORMAL

## 2025-07-25 LAB
ELECTRONICALLY SIGNED BY: NORMAL
FOCUSED SOLID TUMOR DNA/RNA RESULTS: NORMAL

## 2025-07-25 PROCEDURE — G0452 MOLECULAR PATHOLOGY INTERPR: HCPCS | Performed by: INTERNAL MEDICINE

## 2025-07-25 PROCEDURE — 81458 SO GSAP DNA CPY NMBR&MCRSTL: CPT | Performed by: INTERNAL MEDICINE
